# Patient Record
Sex: FEMALE | Race: WHITE | Employment: OTHER | URBAN - METROPOLITAN AREA
[De-identification: names, ages, dates, MRNs, and addresses within clinical notes are randomized per-mention and may not be internally consistent; named-entity substitution may affect disease eponyms.]

---

## 2017-12-27 ENCOUNTER — HOSPITAL ENCOUNTER (OUTPATIENT)
Dept: WOMENS IMAGING | Age: 67
Discharge: HOME | End: 2017-12-27
Payer: MEDICARE

## 2017-12-27 DIAGNOSIS — Z12.31 ENCOUNTER FOR SCREENING MAMMOGRAM FOR MALIGNANT NEOPLASM OF BREAST: ICD-10-CM

## 2017-12-27 PROCEDURE — G0202 SCR MAMMO BI INCL CAD: HCPCS

## 2018-01-09 ENCOUNTER — HOSPITAL ENCOUNTER (OUTPATIENT)
Dept: WOMENS IMAGING | Age: 68
Discharge: HOME | End: 2018-01-09
Payer: MEDICARE

## 2018-01-09 DIAGNOSIS — R92.8 ABNORMAL MAMMOGRAM: ICD-10-CM

## 2018-01-09 PROCEDURE — G0279 TOMOSYNTHESIS, MAMMO: HCPCS

## 2018-01-09 PROCEDURE — 76642 ULTRASOUND BREAST LIMITED: CPT

## 2018-02-26 PROBLEM — E11.9 TYPE 2 DIABETES MELLITUS WITHOUT COMPLICATION, WITHOUT LONG-TERM CURRENT USE OF INSULIN (HCC): Status: ACTIVE | Noted: 2018-02-26

## 2018-05-14 PROBLEM — L57.0 ACTINIC KERATOSES: Status: ACTIVE | Noted: 2018-05-14

## 2018-10-26 ENCOUNTER — HOSPITAL ENCOUNTER (OUTPATIENT)
Age: 68
Discharge: HOME | End: 2018-10-26
Payer: MEDICARE

## 2018-10-26 DIAGNOSIS — R19.7 DIARRHEA OF PRESUMED INFECTIOUS ORIGIN: ICD-10-CM

## 2018-10-26 PROCEDURE — 87505 NFCT AGENT DETECTION GI: CPT

## 2018-10-26 PROCEDURE — 87493 C DIFF AMPLIFIED PROBE: CPT

## 2018-10-27 LAB
C DIFFICILE TOXINS, PCR: NORMAL
CAMPYLOBACTER PCR: NORMAL
SALMONELLA PCR: NORMAL
SHIGATOXIN GENE PCR: NORMAL
SHIGELLA SP PCR: NORMAL
SPECIMEN DESCRIPTION: NORMAL
SPECIMEN: NORMAL

## 2018-12-11 ENCOUNTER — HOSPITAL ENCOUNTER (OUTPATIENT)
Age: 68
Discharge: HOME | End: 2018-12-11
Payer: MEDICARE

## 2018-12-11 DIAGNOSIS — E11.9 TYPE 2 DIABETES MELLITUS WITHOUT COMPLICATION, WITHOUT LONG-TERM CURRENT USE OF INSULIN (HCC): ICD-10-CM

## 2018-12-11 DIAGNOSIS — Z11.59 NEED FOR HEPATITIS C SCREENING TEST: ICD-10-CM

## 2018-12-11 LAB
ABSOLUTE EOS #: 0.1 K/UL (ref 0–0.4)
ABSOLUTE IMMATURE GRANULOCYTE: NORMAL K/UL (ref 0–0.3)
ABSOLUTE LYMPH #: 1.6 K/UL (ref 1–4.8)
ABSOLUTE MONO #: 0.3 K/UL (ref 0.1–1.3)
ALBUMIN SERPL-MCNC: 4.4 G/DL (ref 3.5–5.2)
ALBUMIN/GLOBULIN RATIO: ABNORMAL (ref 1–2.5)
ALP BLD-CCNC: 77 U/L (ref 35–104)
ALT SERPL-CCNC: 18 U/L (ref 5–33)
ANION GAP SERPL CALCULATED.3IONS-SCNC: 10 MMOL/L (ref 9–17)
AST SERPL-CCNC: 15 U/L
BASOPHILS # BLD: 2 % (ref 0–2)
BASOPHILS ABSOLUTE: 0.1 K/UL (ref 0–0.2)
BILIRUB SERPL-MCNC: 0.45 MG/DL (ref 0.3–1.2)
BUN BLDV-MCNC: 28 MG/DL (ref 8–23)
BUN/CREAT BLD: ABNORMAL (ref 9–20)
CALCIUM SERPL-MCNC: 9.6 MG/DL (ref 8.6–10.4)
CHLORIDE BLD-SCNC: 101 MMOL/L (ref 98–107)
CHOLESTEROL/HDL RATIO: 3.2
CHOLESTEROL: 170 MG/DL
CO2: 27 MMOL/L (ref 20–31)
CREAT SERPL-MCNC: 0.73 MG/DL (ref 0.5–0.9)
DIFFERENTIAL TYPE: NORMAL
EOSINOPHILS RELATIVE PERCENT: 3 % (ref 0–4)
GFR AFRICAN AMERICAN: >60 ML/MIN
GFR NON-AFRICAN AMERICAN: >60 ML/MIN
GFR SERPL CREATININE-BSD FRML MDRD: ABNORMAL ML/MIN/{1.73_M2}
GFR SERPL CREATININE-BSD FRML MDRD: ABNORMAL ML/MIN/{1.73_M2}
GLUCOSE BLD-MCNC: 173 MG/DL (ref 70–99)
HCT VFR BLD CALC: 38.6 % (ref 36–46)
HDLC SERPL-MCNC: 53 MG/DL
HEMOGLOBIN: 13.3 G/DL (ref 12–16)
HEPATITIS C ANTIBODY: NONREACTIVE
IMMATURE GRANULOCYTES: NORMAL %
LDL CHOLESTEROL: 85 MG/DL (ref 0–130)
LYMPHOCYTES # BLD: 36 % (ref 24–44)
MCH RBC QN AUTO: 31.4 PG (ref 26–34)
MCHC RBC AUTO-ENTMCNC: 34.5 G/DL (ref 31–37)
MCV RBC AUTO: 91.2 FL (ref 80–100)
MONOCYTES # BLD: 7 % (ref 1–7)
NRBC AUTOMATED: NORMAL PER 100 WBC
PDW BLD-RTO: 12.9 % (ref 11.5–14.9)
PLATELET # BLD: 206 K/UL (ref 150–450)
PLATELET ESTIMATE: NORMAL
PMV BLD AUTO: 7.3 FL (ref 6–12)
POTASSIUM SERPL-SCNC: 4.1 MMOL/L (ref 3.7–5.3)
RBC # BLD: 4.24 M/UL (ref 4–5.2)
RBC # BLD: NORMAL 10*6/UL
SEG NEUTROPHILS: 52 % (ref 36–66)
SEGMENTED NEUTROPHILS ABSOLUTE COUNT: 2.4 K/UL (ref 1.3–9.1)
SODIUM BLD-SCNC: 138 MMOL/L (ref 135–144)
TOTAL PROTEIN: 7.6 G/DL (ref 6.4–8.3)
TRIGL SERPL-MCNC: 161 MG/DL
VLDLC SERPL CALC-MCNC: ABNORMAL MG/DL (ref 1–30)
WBC # BLD: 4.6 K/UL (ref 3.5–11)
WBC # BLD: NORMAL 10*3/UL

## 2018-12-11 PROCEDURE — 80061 LIPID PANEL: CPT

## 2018-12-11 PROCEDURE — 85025 COMPLETE CBC W/AUTO DIFF WBC: CPT

## 2018-12-11 PROCEDURE — 86803 HEPATITIS C AB TEST: CPT

## 2018-12-11 PROCEDURE — 80053 COMPREHEN METABOLIC PANEL: CPT

## 2018-12-11 PROCEDURE — 36415 COLL VENOUS BLD VENIPUNCTURE: CPT

## 2018-12-17 ENCOUNTER — OFFICE VISIT (OUTPATIENT)
Dept: PRIMARY CARE CLINIC | Age: 68
End: 2018-12-17
Payer: MEDICARE

## 2018-12-17 VITALS
OXYGEN SATURATION: 97 % | BODY MASS INDEX: 28.13 KG/M2 | DIASTOLIC BLOOD PRESSURE: 80 MMHG | WEIGHT: 153.8 LBS | HEART RATE: 93 BPM | SYSTOLIC BLOOD PRESSURE: 118 MMHG

## 2018-12-17 DIAGNOSIS — E11.9 TYPE 2 DIABETES MELLITUS WITHOUT COMPLICATION, WITHOUT LONG-TERM CURRENT USE OF INSULIN (HCC): Primary | ICD-10-CM

## 2018-12-17 DIAGNOSIS — L30.8 OTHER ECZEMA: ICD-10-CM

## 2018-12-17 DIAGNOSIS — I10 ESSENTIAL HYPERTENSION: ICD-10-CM

## 2018-12-17 LAB — HBA1C MFR BLD: 7 %

## 2018-12-17 PROCEDURE — 4040F PNEUMOC VAC/ADMIN/RCVD: CPT | Performed by: FAMILY MEDICINE

## 2018-12-17 PROCEDURE — 3045F PR MOST RECENT HEMOGLOBIN A1C LEVEL 7.0-9.0%: CPT | Performed by: FAMILY MEDICINE

## 2018-12-17 PROCEDURE — 1090F PRES/ABSN URINE INCON ASSESS: CPT | Performed by: FAMILY MEDICINE

## 2018-12-17 PROCEDURE — 1036F TOBACCO NON-USER: CPT | Performed by: FAMILY MEDICINE

## 2018-12-17 PROCEDURE — G8482 FLU IMMUNIZE ORDER/ADMIN: HCPCS | Performed by: FAMILY MEDICINE

## 2018-12-17 PROCEDURE — 1123F ACP DISCUSS/DSCN MKR DOCD: CPT | Performed by: FAMILY MEDICINE

## 2018-12-17 PROCEDURE — 1101F PT FALLS ASSESS-DOCD LE1/YR: CPT | Performed by: FAMILY MEDICINE

## 2018-12-17 PROCEDURE — G8427 DOCREV CUR MEDS BY ELIG CLIN: HCPCS | Performed by: FAMILY MEDICINE

## 2018-12-17 PROCEDURE — 83037 HB GLYCOSYLATED A1C HOME DEV: CPT | Performed by: FAMILY MEDICINE

## 2018-12-17 PROCEDURE — G8400 PT W/DXA NO RESULTS DOC: HCPCS | Performed by: FAMILY MEDICINE

## 2018-12-17 PROCEDURE — G8419 CALC BMI OUT NRM PARAM NOF/U: HCPCS | Performed by: FAMILY MEDICINE

## 2018-12-17 PROCEDURE — 99214 OFFICE O/P EST MOD 30 MIN: CPT | Performed by: FAMILY MEDICINE

## 2018-12-17 PROCEDURE — 3017F COLORECTAL CA SCREEN DOC REV: CPT | Performed by: FAMILY MEDICINE

## 2018-12-17 PROCEDURE — 2022F DILAT RTA XM EVC RTNOPTHY: CPT | Performed by: FAMILY MEDICINE

## 2018-12-17 RX ORDER — LISINOPRIL AND HYDROCHLOROTHIAZIDE 25; 20 MG/1; MG/1
TABLET ORAL
Qty: 90 TABLET | Refills: 3 | Status: SHIPPED | OUTPATIENT
Start: 2018-12-17 | End: 2019-12-04 | Stop reason: SDUPTHER

## 2018-12-17 RX ORDER — MELOXICAM 15 MG/1
TABLET ORAL
Qty: 90 TABLET | Refills: 3 | Status: SHIPPED | OUTPATIENT
Start: 2018-12-17 | End: 2019-12-04 | Stop reason: SDUPTHER

## 2018-12-17 ASSESSMENT — ENCOUNTER SYMPTOMS
VOMITING: 0
WHEEZING: 0
SORE THROAT: 0
SHORTNESS OF BREATH: 0
DIARRHEA: 1
RHINORRHEA: 0
ABDOMINAL PAIN: 0
EYE REDNESS: 0
EYE DISCHARGE: 0
NAUSEA: 0
COUGH: 0

## 2018-12-17 NOTE — PROGRESS NOTES
Right eye exhibits no discharge. Left eye exhibits no discharge. No scleral icterus. Neck: No tracheal deviation present. No thyromegaly present. Cardiovascular: Normal rate, regular rhythm and normal heart sounds. No carotid bruits   Pulmonary/Chest: Effort normal and breath sounds normal. No respiratory distress. She has no wheezes. Musculoskeletal: She exhibits no edema. Lymphadenopathy:     She has no cervical adenopathy. Neurological: She is alert and oriented to person, place, and time. Skin: Skin is warm. No rash noted. Spot of flakey red area about quarter size- left breast   Psychiatric: She has a normal mood and affect. Her behavior is normal. Thought content normal.   Nursing note and vitals reviewed. Assessment:       Diagnosis Orders   1. Type 2 diabetes mellitus without complication, without long-term current use of insulin (HCC)  POCT glycosylated hemoglobin, home device (HbA1C)    [place] COLLECTION CAPILLARY BLOOD SPECIMEN   2. Essential hypertension  lisinopril-hydrochlorothiazide (PRINZIDE;ZESTORETIC) 20-25 MG per tablet   3. Other eczema          Plan:    hydrocortisone cream on breast rash- if not better in a week call. Return in about 3 months (around 3/17/2019). Orders Placed This Encounter   Procedures    POCT glycosylated hemoglobin, home device (HbA1C)    [place] COLLECTION CAPILLARY BLOOD SPECIMEN     Orders Placed This Encounter   Medications    metFORMIN (GLUCOPHAGE) 500 MG tablet     Sig: Take 0.5 tablets by mouth 2 times daily (with meals)     Dispense:  90 tablet     Refill:  3    meloxicam (MOBIC) 15 MG tablet     Sig: TAKE ONE TABLET BY MOUTH DAILY AS NEEDED FOR PAIN     Dispense:  90 tablet     Refill:  3    lisinopril-hydrochlorothiazide (PRINZIDE;ZESTORETIC) 20-25 MG per tablet     Sig: TAKE ONE TABLET BY MOUTH DAILY     Dispense:  90 tablet     Refill:  3       Patient given educationalmaterials - see patient instructions.   Discussed use, benefit, and

## 2019-01-03 ENCOUNTER — HOSPITAL ENCOUNTER (OUTPATIENT)
Dept: WOMENS IMAGING | Age: 69
Discharge: HOME | End: 2019-01-05
Payer: MEDICARE

## 2019-01-03 DIAGNOSIS — Z12.31 ENCOUNTER FOR SCREENING MAMMOGRAM FOR MALIGNANT NEOPLASM OF BREAST: ICD-10-CM

## 2019-01-03 PROCEDURE — 77063 BREAST TOMOSYNTHESIS BI: CPT

## 2019-02-14 ENCOUNTER — PATIENT MESSAGE (OUTPATIENT)
Dept: PRIMARY CARE CLINIC | Age: 69
End: 2019-02-14

## 2019-04-01 ENCOUNTER — OFFICE VISIT (OUTPATIENT)
Dept: PRIMARY CARE CLINIC | Age: 69
End: 2019-04-01
Payer: MEDICARE

## 2019-04-01 VITALS
OXYGEN SATURATION: 98 % | DIASTOLIC BLOOD PRESSURE: 82 MMHG | HEIGHT: 62 IN | SYSTOLIC BLOOD PRESSURE: 140 MMHG | HEART RATE: 79 BPM | WEIGHT: 159.5 LBS | BODY MASS INDEX: 29.35 KG/M2

## 2019-04-01 DIAGNOSIS — E11.9 TYPE 2 DIABETES MELLITUS WITHOUT COMPLICATION, WITHOUT LONG-TERM CURRENT USE OF INSULIN (HCC): Primary | ICD-10-CM

## 2019-04-01 DIAGNOSIS — I10 ESSENTIAL HYPERTENSION: ICD-10-CM

## 2019-04-01 LAB — HBA1C MFR BLD: 7.5 %

## 2019-04-01 PROCEDURE — 3017F COLORECTAL CA SCREEN DOC REV: CPT | Performed by: FAMILY MEDICINE

## 2019-04-01 PROCEDURE — 1123F ACP DISCUSS/DSCN MKR DOCD: CPT | Performed by: FAMILY MEDICINE

## 2019-04-01 PROCEDURE — 90732 PPSV23 VACC 2 YRS+ SUBQ/IM: CPT | Performed by: FAMILY MEDICINE

## 2019-04-01 PROCEDURE — G8400 PT W/DXA NO RESULTS DOC: HCPCS | Performed by: FAMILY MEDICINE

## 2019-04-01 PROCEDURE — 99214 OFFICE O/P EST MOD 30 MIN: CPT | Performed by: FAMILY MEDICINE

## 2019-04-01 PROCEDURE — 4040F PNEUMOC VAC/ADMIN/RCVD: CPT | Performed by: FAMILY MEDICINE

## 2019-04-01 PROCEDURE — G0009 ADMIN PNEUMOCOCCAL VACCINE: HCPCS | Performed by: FAMILY MEDICINE

## 2019-04-01 PROCEDURE — 83036 HEMOGLOBIN GLYCOSYLATED A1C: CPT | Performed by: FAMILY MEDICINE

## 2019-04-01 PROCEDURE — 1090F PRES/ABSN URINE INCON ASSESS: CPT | Performed by: FAMILY MEDICINE

## 2019-04-01 PROCEDURE — 3045F PR MOST RECENT HEMOGLOBIN A1C LEVEL 7.0-9.0%: CPT | Performed by: FAMILY MEDICINE

## 2019-04-01 PROCEDURE — 2022F DILAT RTA XM EVC RTNOPTHY: CPT | Performed by: FAMILY MEDICINE

## 2019-04-01 PROCEDURE — G8427 DOCREV CUR MEDS BY ELIG CLIN: HCPCS | Performed by: FAMILY MEDICINE

## 2019-04-01 PROCEDURE — G8419 CALC BMI OUT NRM PARAM NOF/U: HCPCS | Performed by: FAMILY MEDICINE

## 2019-04-01 PROCEDURE — 1036F TOBACCO NON-USER: CPT | Performed by: FAMILY MEDICINE

## 2019-04-01 RX ORDER — BLOOD-GLUCOSE METER
KIT MISCELLANEOUS
Qty: 1 KIT | Refills: 0 | Status: SHIPPED | OUTPATIENT
Start: 2019-04-01 | End: 2019-04-24

## 2019-04-01 ASSESSMENT — ENCOUNTER SYMPTOMS
SHORTNESS OF BREATH: 0
VOMITING: 0
DIARRHEA: 0
NAUSEA: 0
SORE THROAT: 0
ABDOMINAL PAIN: 0
WHEEZING: 0
COUGH: 0
RHINORRHEA: 0
EYE DISCHARGE: 0
EYE REDNESS: 0

## 2019-04-01 ASSESSMENT — PATIENT HEALTH QUESTIONNAIRE - PHQ9
SUM OF ALL RESPONSES TO PHQ QUESTIONS 1-9: 0
SUM OF ALL RESPONSES TO PHQ QUESTIONS 1-9: 0
2. FEELING DOWN, DEPRESSED OR HOPELESS: 0
1. LITTLE INTEREST OR PLEASURE IN DOING THINGS: 0
SUM OF ALL RESPONSES TO PHQ9 QUESTIONS 1 & 2: 0

## 2019-04-01 NOTE — PROGRESS NOTES
[facility] PRIMARY CARE  [id number] [address] [facility]  Dept: [phone]    Sol Arellano is a 77 y.o. female who presents today for her medical conditions/complaintsas noted below. Sol Arellano is c/o of   Chief Complaint   Patient presents with    Diabetes     3 month follow up. Pt does not check her sugars.  Other     Pt has a spot under her right breast she wants looked at. HPI:     HPI  No symptoms of low blood sugars. Not checking sugars. Hemoglobin A1C (%)   Date Value   04/01/2019 7.5   12/17/2018 7.0   09/10/2018 6.0             ( goal A1C is < 7)   No results found for: LABMICR  LDL Cholesterol (mg/dL)   Date Value   12/11/2018 85     LDL Calculated (mg/dL)   Date Value   11/20/2017 78   12/15/2016 91       (goal LDL is <100)   AST (U/L)   Date Value   12/11/2018 15     ALT (U/L)   Date Value   12/11/2018 18     BUN (mg/dL)   Date Value   12/11/2018 28 (H)     BP Readings from Last 3 Encounters:   04/01/19 (!) 140/82   12/17/18 118/80   10/26/18 132/78          (goal 140/90)    Past Medical History:   Diagnosis Date    H/O neoplasm of uncertain behavior of skin     Hypertension     Stye     right eye        Social History     Tobacco Use    Smoking status: Never Smoker    Smokeless tobacco: Never Used   Substance Use Topics    Alcohol use: Yes      Current Outpatient Medications   Medication Sig Dispense Refill    glucose monitoring kit (FREESTYLE) monitoring kit Kit and supplies for checking BS bid due to uncontrolled BS. Does not have to be [place] system- whatever is covered by insurance.  1 kit 0    Probiotic Product (PROBIOTIC DAILY PO) Take by mouth      metFORMIN (GLUCOPHAGE) 500 MG tablet Take 0.5 tablets by mouth 2 times daily (with meals) 90 tablet 3    meloxicam (MOBIC) 15 MG tablet TAKE ONE TABLET BY MOUTH DAILY AS NEEDED FOR PAIN 90 tablet 3    lisinopril-hydrochlorothiazide (PRINZIDE;ZESTORETIC) 20-25 MG Patient given educationalmaterials - see patient instructions. Discussed use, benefit, and side effectsof prescribed medications. All patient questions answered. Pt voiced understanding. Reviewed health maintenance. Instructed to continue current medications, diet andexercise. Patient agreed with treatment plan. Follow up as directed.      Electronicallysigned by Melina Jj MD on 4/1/2019 at 3:30 PM

## 2019-04-02 LAB
CREATINE, URINE: 73 MG/DL (ref 28–217)
MICROALBUMIN/CREAT 24H UR: <1.2 MG/DL (ref 1.2–40)
MICROALBUMIN/CREAT UR-RTO: ABNORMAL MG/G

## 2019-04-12 ENCOUNTER — OFFICE VISIT (OUTPATIENT)
Dept: PRIMARY CARE CLINIC | Age: 69
End: 2019-04-12
Payer: MEDICARE

## 2019-04-12 VITALS
OXYGEN SATURATION: 97 % | DIASTOLIC BLOOD PRESSURE: 76 MMHG | HEART RATE: 80 BPM | BODY MASS INDEX: 28.59 KG/M2 | SYSTOLIC BLOOD PRESSURE: 120 MMHG | WEIGHT: 157.6 LBS

## 2019-04-12 DIAGNOSIS — D48.5 NEOPLASM OF UNCERTAIN BEHAVIOR OF SKIN: ICD-10-CM

## 2019-04-12 DIAGNOSIS — D18.01 CHERRY ANGIOMA: Primary | ICD-10-CM

## 2019-04-12 DIAGNOSIS — L82.1 SEBORRHEIC KERATOSES: ICD-10-CM

## 2019-04-12 DIAGNOSIS — L57.0 ACTINIC KERATOSES: ICD-10-CM

## 2019-04-12 PROCEDURE — 17000 DESTRUCT PREMALG LESION: CPT | Performed by: PHYSICIAN ASSISTANT

## 2019-04-12 PROCEDURE — 99214 OFFICE O/P EST MOD 30 MIN: CPT | Performed by: PHYSICIAN ASSISTANT

## 2019-04-12 NOTE — PROGRESS NOTES
[facility] Primary Care  [address] [facility]  Phone: [phone]  Fax: [phone]    Guerita Mg is a 77 y.o. female who presents today for her Complete Skin Check. Personal history ofskin cancer:  No  Family history of skin cancer? Yes Type? Melanoma in brother  Any Concerning Lesions: Under right breast  Do you wear Sunscreen:  Yes  Do you work or play outsideregularly:  No      Physical Exam  Face:  One rough papule on right temple, o wounds, no dryness or color changes  Ears:  No suspicious lesions, no wounds, no dryness or color changes  Neck:  No suspicious lesions, no wounds, no dryness or color changes  Scalp:  Hair normal, no patchy-spots noted  Chest:  Chest appears Normal with several cherry angiomas of various sizes, scattered SKs and few compound nevi, well circumscribed. Back:  Back appears normal with scattered SKs, largest on left back in braline. Scattered cherry angiomas. Legs:  Right posterior leg: irritated flesh colored papule   Arms:  No suspicious lesions, no wounds, no dryness or color changes  Hands:  No Callus, Ulcers, Abnormal nails or suspicious lesions  Breasts:  No suspicious lesions, no wounds or dryness or color changes. 2 SKs on left breast and 1 under right breast is irritated        Plan:  Discussed good skin care. Discussed proper sun Protection. Patienteducation given on skin cancer and precancers. Moisturize the AK treated today to minimize irritation     Return for Friday for 2 shave biopsies .     [facility]

## 2019-04-12 NOTE — PROGRESS NOTES
[facility] Keratoses destruction procedure note:  4/12/2019  Marielos Bone  1950    /76   Pulse 80   Wt 157 lb 9.6 oz (71.5 kg)   SpO2 97%   BMI 28.59 kg/m²   ALL VITALS REVIEWED    No Known Allergies    Chief Complaint   Patient presents with    Procedure     Full body skin check     Written consent was obtained. Lesion(s) cleansed with Alcohol. Location:  Right preauricular    Histofreeze applied with applicator. Numberlesions treated: 1    Physical Exam   HENT:   Head:           Patient tolerated procedure well. Diagnosis Orders   1. Cherry angioma     2. Seborrheic keratoses     3. Actinic keratoses  WY DESTRUC PREMALIGNANT, FIRST LESION   4. Neoplasm of uncertain behavior of skin         Follow Up: Wound care discussed. Keep well moisturized. Watch for signs of infection which would include:  Redness, swelling, fever. If signs appear, please return to office. Return for Friday for 2 shave biopsies .     Electronically signed by Sheri Mike PA-C on 4/12/2019 at 8:33 AM

## 2019-04-16 NOTE — ADDENDUM NOTE
Addended by: Marcha Bamberger NATALIIA on: 4/16/2019 12:54 PM     Modules accepted: Level of Service

## 2019-04-19 ENCOUNTER — HOSPITAL ENCOUNTER (OUTPATIENT)
Age: 69
Setting detail: SPECIMEN
Discharge: HOME | End: 2019-04-19
Payer: MEDICARE

## 2019-04-19 ENCOUNTER — PROCEDURE VISIT (OUTPATIENT)
Dept: PRIMARY CARE CLINIC | Age: 69
End: 2019-04-19
Payer: MEDICARE

## 2019-04-19 VITALS
HEART RATE: 60 BPM | OXYGEN SATURATION: 96 % | BODY MASS INDEX: 28.34 KG/M2 | DIASTOLIC BLOOD PRESSURE: 70 MMHG | WEIGHT: 156.2 LBS | SYSTOLIC BLOOD PRESSURE: 122 MMHG

## 2019-04-19 DIAGNOSIS — L82.0 INFLAMED SEBORRHEIC KERATOSIS: ICD-10-CM

## 2019-04-19 DIAGNOSIS — D48.5 NEOPLASM OF UNCERTAIN BEHAVIOR OF SKIN: Primary | ICD-10-CM

## 2019-04-19 PROCEDURE — 11102 TANGNTL BX SKIN SINGLE LES: CPT | Performed by: PHYSICIAN ASSISTANT

## 2019-04-19 PROCEDURE — 11103 TANGNTL BX SKIN EA SEP/ADDL: CPT | Performed by: PHYSICIAN ASSISTANT

## 2019-04-19 NOTE — PROGRESS NOTES
Skin Biopsy Procedure Note  4/19/2019  [facility]  1950    /70   Pulse 60   Wt 156 lb 3.2 oz (70.9 kg)   SpO2 96%   BMI 28.34 kg/m²   VITALS REVIEWED    No Known Allergies    Chief Complaint   Patient presents with    Procedure       Lesion:  · 1. Under right breast in bra line  · 2. Posterior right leg      Indication for Biopsy 1: Irritated SK  Indication for Biopsy 2: non healing hyperkeratotic lesion. Family hx of melanoma      Procedure: The lesion(s) were cleansed with Alcohol.  2% lidocaine without epinephrine was used for local anaesthesia. 1. A 1cm  shave was used to obtain a specimen. 2.    A 4mm shave was used. The specimen(s) were    preserved and sent for pathological examination. The biopsy site(s) were  cleansed and hemostasis using ACl and a pressure dressing was achieved with good results. The patient was instructed on wound care. No complications occurred and the patient tolerated the procedure well. Diagnosis Orders   1. Neoplasm of uncertain behavior of skin     2. Inflamed seborrheic keratosis         Follow Up: Wound care discussed. Keep well moisturized. Watch for signs of infection which would include:  Redness, swelling, fever. If signs appear, please return to office. Return for Will call with results.        Electronically signed by Leonard Cabrera PA-C on 4/19/2019 at 10:22 AM

## 2019-04-23 ENCOUNTER — PATIENT MESSAGE (OUTPATIENT)
Dept: PRIMARY CARE CLINIC | Age: 69
End: 2019-04-23

## 2019-04-23 DIAGNOSIS — E11.9 TYPE 2 DIABETES MELLITUS WITHOUT COMPLICATION, WITHOUT LONG-TERM CURRENT USE OF INSULIN (HCC): Primary | ICD-10-CM

## 2019-04-23 DIAGNOSIS — R73.9 HYPERGLYCEMIA: ICD-10-CM

## 2019-04-23 LAB — DERMATOLOGY PATHOLOGY REPORT: NORMAL

## 2019-04-24 RX ORDER — GLUCOSAMINE HCL/CHONDROITIN SU 500-400 MG
CAPSULE ORAL
Qty: 100 STRIP | Refills: 3 | Status: SHIPPED | OUTPATIENT
Start: 2019-04-24 | End: 2019-04-24

## 2019-04-24 NOTE — TELEPHONE ENCOUNTER
Patient called back and advised that [facility] should have been removed from chart a couple of years ago. Now removed and clarified type of strips, how often testing. Cancelled [facility] script and sent new script to [facility].

## 2019-04-24 NOTE — TELEPHONE ENCOUNTER
From: Kimmie Muller  To: Jasmyne Potts MD  Sent: 4/23/2019 11:37 PM EDT  Subject: Prescription Question    Please send a refill for test strips. Refill list shows the glucose meter and supplies. Obviously I only need the test strips. Thanks.     Nabila El

## 2019-06-03 ENCOUNTER — TELEPHONE (OUTPATIENT)
Dept: PRIMARY CARE CLINIC | Age: 69
End: 2019-06-03

## 2019-06-03 NOTE — TELEPHONE ENCOUNTER
Left a message for Ananda Oates regarding her appointment scheduled for 07/01/2019. Please reschedule when patient returns the call. Thank you.

## 2019-07-08 ENCOUNTER — OFFICE VISIT (OUTPATIENT)
Dept: PRIMARY CARE CLINIC | Age: 69
End: 2019-07-08
Payer: MEDICARE

## 2019-07-08 VITALS
OXYGEN SATURATION: 97 % | WEIGHT: 154.8 LBS | SYSTOLIC BLOOD PRESSURE: 116 MMHG | BODY MASS INDEX: 28.09 KG/M2 | DIASTOLIC BLOOD PRESSURE: 68 MMHG | HEART RATE: 78 BPM

## 2019-07-08 DIAGNOSIS — I10 ESSENTIAL HYPERTENSION: ICD-10-CM

## 2019-07-08 DIAGNOSIS — E11.9 TYPE 2 DIABETES MELLITUS WITHOUT COMPLICATION, WITHOUT LONG-TERM CURRENT USE OF INSULIN (HCC): Primary | ICD-10-CM

## 2019-07-08 LAB — HBA1C MFR BLD: 7.3 %

## 2019-07-08 PROCEDURE — 1090F PRES/ABSN URINE INCON ASSESS: CPT | Performed by: FAMILY MEDICINE

## 2019-07-08 PROCEDURE — 4040F PNEUMOC VAC/ADMIN/RCVD: CPT | Performed by: FAMILY MEDICINE

## 2019-07-08 PROCEDURE — 1123F ACP DISCUSS/DSCN MKR DOCD: CPT | Performed by: FAMILY MEDICINE

## 2019-07-08 PROCEDURE — 3017F COLORECTAL CA SCREEN DOC REV: CPT | Performed by: FAMILY MEDICINE

## 2019-07-08 PROCEDURE — 99213 OFFICE O/P EST LOW 20 MIN: CPT | Performed by: FAMILY MEDICINE

## 2019-07-08 PROCEDURE — G8427 DOCREV CUR MEDS BY ELIG CLIN: HCPCS | Performed by: FAMILY MEDICINE

## 2019-07-08 PROCEDURE — 3045F PR MOST RECENT HEMOGLOBIN A1C LEVEL 7.0-9.0%: CPT | Performed by: FAMILY MEDICINE

## 2019-07-08 PROCEDURE — 2022F DILAT RTA XM EVC RTNOPTHY: CPT | Performed by: FAMILY MEDICINE

## 2019-07-08 PROCEDURE — 1036F TOBACCO NON-USER: CPT | Performed by: FAMILY MEDICINE

## 2019-07-08 PROCEDURE — G8400 PT W/DXA NO RESULTS DOC: HCPCS | Performed by: FAMILY MEDICINE

## 2019-07-08 PROCEDURE — 83036 HEMOGLOBIN GLYCOSYLATED A1C: CPT | Performed by: FAMILY MEDICINE

## 2019-07-08 PROCEDURE — G8419 CALC BMI OUT NRM PARAM NOF/U: HCPCS | Performed by: FAMILY MEDICINE

## 2019-07-08 ASSESSMENT — ENCOUNTER SYMPTOMS
EYE DISCHARGE: 0
CONSTIPATION: 0
SHORTNESS OF BREATH: 0
VOMITING: 0
EYE PAIN: 0
DIARRHEA: 0
CHEST TIGHTNESS: 0
NAUSEA: 0
SORE THROAT: 0
COUGH: 0

## 2019-07-08 NOTE — PROGRESS NOTES
distress. HENT:   Head: Normocephalic and atraumatic. Mouth/Throat: Oropharynx is clear and moist.   Eyes: Pupils are equal, round, and reactive to light. Conjunctivae are normal. Right eye exhibits no discharge. Left eye exhibits no discharge. No scleral icterus. Neck: No tracheal deviation present. No thyromegaly present. Cardiovascular: Normal rate, regular rhythm and normal heart sounds. No carotid bruits   Pulmonary/Chest: Effort normal and breath sounds normal. No respiratory distress. She has no wheezes. Musculoskeletal: She exhibits no edema. Lymphadenopathy:     She has no cervical adenopathy. Neurological: She is alert and oriented to person, place, and time. Skin: Skin is warm. No rash noted. Psychiatric: She has a normal mood and affect. Her behavior is normal. Thought content normal.   Nursing note and vitals reviewed. Assessment:       Diagnosis Orders   1. Type 2 diabetes mellitus without complication, without long-term current use of insulin (HCC)  [place] COLLECTION CAPILLARY BLOOD SPECIMEN    POCT glycosylated hemoglobin (Hb A1C)   2. Essential hypertension          Plan:      Return in about 3 months (around 10/8/2019) for DM check, HTN f/u. Orders Placed This Encounter   Procedures    POCT glycosylated hemoglobin (Hb A1C)    [place] COLLECTION CAPILLARY BLOOD SPECIMEN     No orders of the defined types were placed in this encounter. Patient given educationalmaterials - see patient instructions. Discussed use, benefit, and side effectsof prescribed medications. All patient questions answered. Pt voiced understanding. Reviewed health maintenance. Instructed to continue current medications, diet andexercise. Patient agreed with treatment plan. Follow up as directed.      Electronicallysigned by Dorian Powell MD on 7/8/2019 at 2:17 PM

## 2019-09-23 ENCOUNTER — OFFICE VISIT (OUTPATIENT)
Dept: PRIMARY CARE CLINIC | Age: 69
End: 2019-09-23
Payer: MEDICARE

## 2019-09-23 VITALS
HEIGHT: 62 IN | HEART RATE: 70 BPM | OXYGEN SATURATION: 96 % | DIASTOLIC BLOOD PRESSURE: 82 MMHG | SYSTOLIC BLOOD PRESSURE: 130 MMHG | WEIGHT: 155 LBS | BODY MASS INDEX: 28.52 KG/M2

## 2019-09-23 DIAGNOSIS — Z00.00 MEDICARE ANNUAL WELLNESS VISIT, SUBSEQUENT: Primary | ICD-10-CM

## 2019-09-23 DIAGNOSIS — Z78.0 MENOPAUSE: ICD-10-CM

## 2019-09-23 DIAGNOSIS — Z00.00 ROUTINE GENERAL MEDICAL EXAMINATION AT A HEALTH CARE FACILITY: ICD-10-CM

## 2019-09-23 PROCEDURE — G0439 PPPS, SUBSEQ VISIT: HCPCS | Performed by: FAMILY MEDICINE

## 2019-09-23 PROCEDURE — 3017F COLORECTAL CA SCREEN DOC REV: CPT | Performed by: FAMILY MEDICINE

## 2019-09-23 PROCEDURE — 4040F PNEUMOC VAC/ADMIN/RCVD: CPT | Performed by: FAMILY MEDICINE

## 2019-09-23 PROCEDURE — 1123F ACP DISCUSS/DSCN MKR DOCD: CPT | Performed by: FAMILY MEDICINE

## 2019-09-23 SDOH — HEALTH STABILITY: MENTAL HEALTH: HOW OFTEN DO YOU HAVE A DRINK CONTAINING ALCOHOL?: 2-4 TIMES A MONTH

## 2019-09-23 ASSESSMENT — PATIENT HEALTH QUESTIONNAIRE - PHQ9
SUM OF ALL RESPONSES TO PHQ QUESTIONS 1-9: 0
1. LITTLE INTEREST OR PLEASURE IN DOING THINGS: 0
2. FEELING DOWN, DEPRESSED OR HOPELESS: 0
SUM OF ALL RESPONSES TO PHQ9 QUESTIONS 1 & 2: 0
SUM OF ALL RESPONSES TO PHQ QUESTIONS 1-9: 0

## 2019-09-23 NOTE — PROGRESS NOTES
Mother     Heart Disease Father     High Blood Pressure Father     Parkinsonism Father     Cancer Brother         melanoma- metastaic    Hypertension Brother     Other Brother         neuro degenerative disease    Mult Sclerosis Nephew        CareTeam (Including outside providers/suppliers regularly involved in providing care):   Patient Care Team:  Joaquin West MD as PCP - General  Joaquin West MD as PCP [facility]    Wt Readings from Last 3 Encounters:   09/23/19 155 lb (70.3 kg)   07/08/19 154 lb 12.8 oz (70.2 kg)   04/19/19 156 lb 3.2 oz (70.9 kg)     Vitals:    09/23/19 1321   BP: 130/82   Pulse: 70   SpO2: 96%   Weight: 155 lb (70.3 kg)   Height: 5' 2\" (1.575 m)     Body mass index is 28.35 kg/m². Based upon direct observation of the patient, evaluation of cognition reveals recent and remote memory intact. Physical Exam   Constitutional: She is oriented to person, place, and time. She appears well-developed and well-nourished. No distress. HENT:   Head: Normocephalic and atraumatic. Eyes: Pupils are equal, round, and reactive to light. Conjunctivae are normal. Right eye exhibits no discharge. Left eye exhibits no discharge. No scleral icterus. Neck: No tracheal deviation present. No thyromegaly present. Cardiovascular: Normal rate, regular rhythm and normal heart sounds. No carotid bruits   Pulmonary/Chest: Effort normal and breath sounds normal. No respiratory distress. She has no wheezes. Musculoskeletal: She exhibits no edema. Lymphadenopathy:     She has no cervical adenopathy. Neurological: She is alert and oriented to person, place, and time. Skin: Skin is warm. No rash noted. Psychiatric: She has a normal mood and affect. Her behavior is normal. Thought content normal.   Nursing note and vitals reviewed. Patient's complete Health Risk Assessment and screening values have been reviewed and are found in Flowsheets.  The following

## 2019-10-22 ENCOUNTER — PROCEDURE VISIT (OUTPATIENT)
Dept: PRIMARY CARE CLINIC | Age: 69
End: 2019-10-22
Payer: MEDICARE

## 2019-10-22 DIAGNOSIS — Z78.0 ASYMPTOMATIC MENOPAUSAL STATE: Primary | ICD-10-CM

## 2019-10-22 DIAGNOSIS — Z78.0 MENOPAUSE: ICD-10-CM

## 2019-10-22 PROCEDURE — 77080 DXA BONE DENSITY AXIAL: CPT | Performed by: FAMILY MEDICINE

## 2019-11-21 ENCOUNTER — OFFICE VISIT (OUTPATIENT)
Dept: PRIMARY CARE CLINIC | Age: 69
End: 2019-11-21
Payer: MEDICARE

## 2019-11-21 VITALS
HEART RATE: 126 BPM | DIASTOLIC BLOOD PRESSURE: 90 MMHG | OXYGEN SATURATION: 96 % | BODY MASS INDEX: 28.2 KG/M2 | SYSTOLIC BLOOD PRESSURE: 146 MMHG | WEIGHT: 154.2 LBS

## 2019-11-21 DIAGNOSIS — M85.89 OSTEOPENIA OF MULTIPLE SITES: ICD-10-CM

## 2019-11-21 DIAGNOSIS — E11.9 TYPE 2 DIABETES MELLITUS WITHOUT COMPLICATION, WITHOUT LONG-TERM CURRENT USE OF INSULIN (HCC): Primary | ICD-10-CM

## 2019-11-21 DIAGNOSIS — I10 ESSENTIAL HYPERTENSION: ICD-10-CM

## 2019-11-21 LAB — HBA1C MFR BLD: 7 %

## 2019-11-21 PROCEDURE — G8427 DOCREV CUR MEDS BY ELIG CLIN: HCPCS | Performed by: FAMILY MEDICINE

## 2019-11-21 PROCEDURE — G8399 PT W/DXA RESULTS DOCUMENT: HCPCS | Performed by: FAMILY MEDICINE

## 2019-11-21 PROCEDURE — 1036F TOBACCO NON-USER: CPT | Performed by: FAMILY MEDICINE

## 2019-11-21 PROCEDURE — 1090F PRES/ABSN URINE INCON ASSESS: CPT | Performed by: FAMILY MEDICINE

## 2019-11-21 PROCEDURE — 83036 HEMOGLOBIN GLYCOSYLATED A1C: CPT | Performed by: FAMILY MEDICINE

## 2019-11-21 PROCEDURE — G8482 FLU IMMUNIZE ORDER/ADMIN: HCPCS | Performed by: FAMILY MEDICINE

## 2019-11-21 PROCEDURE — 4040F PNEUMOC VAC/ADMIN/RCVD: CPT | Performed by: FAMILY MEDICINE

## 2019-11-21 PROCEDURE — 2022F DILAT RTA XM EVC RTNOPTHY: CPT | Performed by: FAMILY MEDICINE

## 2019-11-21 PROCEDURE — 3017F COLORECTAL CA SCREEN DOC REV: CPT | Performed by: FAMILY MEDICINE

## 2019-11-21 PROCEDURE — 99214 OFFICE O/P EST MOD 30 MIN: CPT | Performed by: FAMILY MEDICINE

## 2019-11-21 PROCEDURE — 1123F ACP DISCUSS/DSCN MKR DOCD: CPT | Performed by: FAMILY MEDICINE

## 2019-11-21 PROCEDURE — G8417 CALC BMI ABV UP PARAM F/U: HCPCS | Performed by: FAMILY MEDICINE

## 2019-11-21 ASSESSMENT — ENCOUNTER SYMPTOMS
RHINORRHEA: 0
COUGH: 0
SORE THROAT: 0
NAUSEA: 0
VOMITING: 0
CHEST TIGHTNESS: 0
SHORTNESS OF BREATH: 0

## 2019-12-04 DIAGNOSIS — I10 ESSENTIAL HYPERTENSION: ICD-10-CM

## 2019-12-05 RX ORDER — MELOXICAM 15 MG/1
TABLET ORAL
Qty: 90 TABLET | Refills: 3 | Status: SHIPPED | OUTPATIENT
Start: 2019-12-05 | End: 2020-11-24

## 2019-12-05 RX ORDER — LISINOPRIL AND HYDROCHLOROTHIAZIDE 25; 20 MG/1; MG/1
TABLET ORAL
Qty: 90 TABLET | Refills: 3 | Status: SHIPPED | OUTPATIENT
Start: 2019-12-05 | End: 2021-02-21 | Stop reason: SDUPTHER

## 2019-12-27 ENCOUNTER — HOSPITAL ENCOUNTER (OUTPATIENT)
Age: 69
Discharge: HOME | End: 2019-12-27
Payer: MEDICARE

## 2019-12-27 DIAGNOSIS — E11.9 TYPE 2 DIABETES MELLITUS WITHOUT COMPLICATION, WITHOUT LONG-TERM CURRENT USE OF INSULIN (HCC): ICD-10-CM

## 2019-12-27 DIAGNOSIS — I10 ESSENTIAL HYPERTENSION: ICD-10-CM

## 2019-12-27 LAB
ABSOLUTE EOS #: 0.1 K/UL (ref 0–0.4)
ABSOLUTE IMMATURE GRANULOCYTE: NORMAL K/UL (ref 0–0.3)
ABSOLUTE LYMPH #: 1.9 K/UL (ref 1–4.8)
ABSOLUTE MONO #: 0.4 K/UL (ref 0.1–1.3)
ALBUMIN SERPL-MCNC: 4.2 G/DL (ref 3.5–5.2)
ALBUMIN/GLOBULIN RATIO: ABNORMAL (ref 1–2.5)
ALP BLD-CCNC: 75 U/L (ref 35–104)
ALT SERPL-CCNC: 16 U/L (ref 5–33)
ANION GAP SERPL CALCULATED.3IONS-SCNC: 13 MMOL/L (ref 9–17)
AST SERPL-CCNC: 15 U/L
BASOPHILS # BLD: 1 % (ref 0–2)
BASOPHILS ABSOLUTE: 0.1 K/UL (ref 0–0.2)
BILIRUB SERPL-MCNC: 0.45 MG/DL (ref 0.3–1.2)
BUN BLDV-MCNC: 25 MG/DL (ref 8–23)
BUN/CREAT BLD: ABNORMAL (ref 9–20)
CALCIUM SERPL-MCNC: 9.1 MG/DL (ref 8.6–10.4)
CHLORIDE BLD-SCNC: 104 MMOL/L (ref 98–107)
CO2: 24 MMOL/L (ref 20–31)
CREAT SERPL-MCNC: 0.67 MG/DL (ref 0.5–0.9)
DIFFERENTIAL TYPE: NORMAL
EOSINOPHILS RELATIVE PERCENT: 3 % (ref 0–4)
GFR AFRICAN AMERICAN: >60 ML/MIN
GFR NON-AFRICAN AMERICAN: >60 ML/MIN
GFR SERPL CREATININE-BSD FRML MDRD: ABNORMAL ML/MIN/{1.73_M2}
GFR SERPL CREATININE-BSD FRML MDRD: ABNORMAL ML/MIN/{1.73_M2}
GLUCOSE BLD-MCNC: 145 MG/DL (ref 70–99)
HCT VFR BLD CALC: 38.8 % (ref 36–46)
HEMOGLOBIN: 13.5 G/DL (ref 12–16)
IMMATURE GRANULOCYTES: NORMAL %
LYMPHOCYTES # BLD: 38 % (ref 24–44)
MCH RBC QN AUTO: 31.3 PG (ref 26–34)
MCHC RBC AUTO-ENTMCNC: 34.7 G/DL (ref 31–37)
MCV RBC AUTO: 90.2 FL (ref 80–100)
MONOCYTES # BLD: 7 % (ref 1–7)
NRBC AUTOMATED: NORMAL PER 100 WBC
PDW BLD-RTO: 12.5 % (ref 11.5–14.9)
PLATELET # BLD: 204 K/UL (ref 150–450)
PLATELET ESTIMATE: NORMAL
PMV BLD AUTO: 7.4 FL (ref 6–12)
POTASSIUM SERPL-SCNC: 4 MMOL/L (ref 3.7–5.3)
RBC # BLD: 4.3 M/UL (ref 4–5.2)
RBC # BLD: NORMAL 10*6/UL
SEG NEUTROPHILS: 51 % (ref 36–66)
SEGMENTED NEUTROPHILS ABSOLUTE COUNT: 2.6 K/UL (ref 1.3–9.1)
SODIUM BLD-SCNC: 141 MMOL/L (ref 135–144)
TOTAL PROTEIN: 7.3 G/DL (ref 6.4–8.3)
WBC # BLD: 5 K/UL (ref 3.5–11)
WBC # BLD: NORMAL 10*3/UL

## 2019-12-27 PROCEDURE — 36415 COLL VENOUS BLD VENIPUNCTURE: CPT

## 2019-12-27 PROCEDURE — 85025 COMPLETE CBC W/AUTO DIFF WBC: CPT

## 2019-12-27 PROCEDURE — 80053 COMPREHEN METABOLIC PANEL: CPT

## 2020-03-02 ENCOUNTER — OFFICE VISIT (OUTPATIENT)
Dept: PRIMARY CARE CLINIC | Age: 70
End: 2020-03-02
Payer: MEDICARE

## 2020-03-02 VITALS
OXYGEN SATURATION: 97 % | DIASTOLIC BLOOD PRESSURE: 72 MMHG | HEART RATE: 96 BPM | SYSTOLIC BLOOD PRESSURE: 140 MMHG | WEIGHT: 154.4 LBS | BODY MASS INDEX: 28.24 KG/M2

## 2020-03-02 LAB — HBA1C MFR BLD: 7.2 %

## 2020-03-02 PROCEDURE — 3017F COLORECTAL CA SCREEN DOC REV: CPT | Performed by: FAMILY MEDICINE

## 2020-03-02 PROCEDURE — 1090F PRES/ABSN URINE INCON ASSESS: CPT | Performed by: FAMILY MEDICINE

## 2020-03-02 PROCEDURE — 3051F HG A1C>EQUAL 7.0%<8.0%: CPT | Performed by: FAMILY MEDICINE

## 2020-03-02 PROCEDURE — G8399 PT W/DXA RESULTS DOCUMENT: HCPCS | Performed by: FAMILY MEDICINE

## 2020-03-02 PROCEDURE — 99213 OFFICE O/P EST LOW 20 MIN: CPT | Performed by: FAMILY MEDICINE

## 2020-03-02 PROCEDURE — 4040F PNEUMOC VAC/ADMIN/RCVD: CPT | Performed by: FAMILY MEDICINE

## 2020-03-02 PROCEDURE — 1123F ACP DISCUSS/DSCN MKR DOCD: CPT | Performed by: FAMILY MEDICINE

## 2020-03-02 PROCEDURE — 1036F TOBACCO NON-USER: CPT | Performed by: FAMILY MEDICINE

## 2020-03-02 PROCEDURE — G8427 DOCREV CUR MEDS BY ELIG CLIN: HCPCS | Performed by: FAMILY MEDICINE

## 2020-03-02 PROCEDURE — G8482 FLU IMMUNIZE ORDER/ADMIN: HCPCS | Performed by: FAMILY MEDICINE

## 2020-03-02 PROCEDURE — 2022F DILAT RTA XM EVC RTNOPTHY: CPT | Performed by: FAMILY MEDICINE

## 2020-03-02 PROCEDURE — G8417 CALC BMI ABV UP PARAM F/U: HCPCS | Performed by: FAMILY MEDICINE

## 2020-03-02 PROCEDURE — 83036 HEMOGLOBIN GLYCOSYLATED A1C: CPT | Performed by: FAMILY MEDICINE

## 2020-03-02 ASSESSMENT — ENCOUNTER SYMPTOMS
RHINORRHEA: 0
NAUSEA: 0
EYE REDNESS: 0
SHORTNESS OF BREATH: 0
COUGH: 0
VOMITING: 0
ABDOMINAL PAIN: 0
WHEEZING: 0
EYE DISCHARGE: 0
DIARRHEA: 1
SORE THROAT: 0

## 2020-03-02 ASSESSMENT — PATIENT HEALTH QUESTIONNAIRE - PHQ9
SUM OF ALL RESPONSES TO PHQ9 QUESTIONS 1 & 2: 0
2. FEELING DOWN, DEPRESSED OR HOPELESS: 0
1. LITTLE INTEREST OR PLEASURE IN DOING THINGS: 0
SUM OF ALL RESPONSES TO PHQ QUESTIONS 1-9: 0
SUM OF ALL RESPONSES TO PHQ QUESTIONS 1-9: 0

## 2020-03-02 NOTE — PROGRESS NOTES
[facility] PRIMARY CARE  [address] [facility] [address]  Dept: [phone] Rambo Gio Goyal is a 68 y.o. female who presents today for her medical conditions/complaintsas noted below. Bradley Robertson is c/o of   Chief Complaint   Patient presents with    Diabetes     Patient checks bs at home    Hypertension     Patient doesn't check bp at home       HPI:     HPI  Checking sugars about every morning. Pt increased the metformin to 1.5 tablets but 2 caused too much diarrhea. Still having a bit of cramping occas and some loose stool, but not like it was. Running 100's most of the time. One time was over 200 but not sure why. Walked and rechecked and it was down. Not checking BP at home. No issues with meds otherwise.       Hemoglobin A1C (%)   Date Value   03/02/2020 7.2   11/21/2019 7.0   07/08/2019 7.3             ( goal A1C is < 7)   No results found for: LABMICR  LDL Cholesterol (mg/dL)   Date Value   12/11/2018 85     LDL Calculated (mg/dL)   Date Value   11/20/2017 78   12/15/2016 91       (goal LDL is <100)   AST (U/L)   Date Value   12/27/2019 15     ALT (U/L)   Date Value   12/27/2019 16     BUN (mg/dL)   Date Value   12/27/2019 25 (H)     BP Readings from Last 3 Encounters:   03/02/20 (!) 140/72   11/21/19 (!) 146/90   09/23/19 130/82          (goal 140/90)    Past Medical History:   Diagnosis Date    H/O neoplasm of uncertain behavior of skin     Hypertension     Stye     right eye        Social History     Tobacco Use    Smoking status: Never Smoker    Smokeless tobacco: Never Used   Substance Use Topics    Alcohol use: Yes     Frequency: 2-4 times a month      Current Outpatient Medications   Medication Sig Dispense Refill    blood glucose test strips (TRUE METRIX BLOOD GLUCOSE TEST) strip 1 each by Miscell. (Med.Supl.;Non-Drugs) route daily 100 each 3    metFORMIN (GLUCOPHAGE) 500 MG tablet Take 1 tablet by mouth 2 times daily (with and frequency. Musculoskeletal: Negative for arthralgias and myalgias. Neurological: Negative for dizziness, light-headedness and headaches. Psychiatric/Behavioral: Negative for sleep disturbance. Objective:     BP (!) 140/72   Pulse 96   Wt 154 lb 6.4 oz (70 kg)   SpO2 97%   BMI 28.24 kg/m²   Physical Exam  Vitals signs and nursing note reviewed. Constitutional:       General: She is not in acute distress. Appearance: She is well-developed. HENT:      Head: Normocephalic and atraumatic. Eyes:      General: No scleral icterus. Right eye: No discharge. Left eye: No discharge. Conjunctiva/sclera: Conjunctivae normal.      Pupils: Pupils are equal, round, and reactive to light. Neck:      Thyroid: No thyromegaly. Trachea: No tracheal deviation. Cardiovascular:      Rate and Rhythm: Normal rate and regular rhythm. Heart sounds: Normal heart sounds. Comments: No carotid bruits  Pulmonary:      Effort: Pulmonary effort is normal. No respiratory distress. Breath sounds: Normal breath sounds. No wheezing. Lymphadenopathy:      Cervical: No cervical adenopathy. Skin:     General: Skin is warm. Findings: No rash. Neurological:      Mental Status: She is alert and oriented to person, place, and time. Psychiatric:         Behavior: Behavior normal.         Thought Content: Thought content normal.         Assessment:       Diagnosis Orders   1. Sciatica of right side  External Referral To Massage Therapy   2. Type 2 diabetes mellitus without complication, without long-term current use of insulin ([facility])  blood glucose test strips (TRUE METRIX BLOOD GLUCOSE TEST) strip    POCT glycosylated hemoglobin (Hb A1C)   3. Encounter for screening mammogram for breast cancer  GERRY DIGITAL SCREEN W CAD BILATERAL   4.  Hyperglycemia  blood glucose test strips (TRUE METRIX BLOOD GLUCOSE TEST) strip        Plan:      Return in about 3 months (around 6/2/2020) for DM check.    Orders Placed This Encounter   Procedures    GERRY DIGITAL SCREEN W CAD BILATERAL     Standing Status:   Future     Standing Expiration Date:   2021    External Referral To Massage Therapy     Referral Priority:   Routine     Referral Type:   Consult for Advice and Opinion     Referral Reason:   Specialty Services Required     Requested Specialty:   Massage Therapy     Number of Visits Requested:   1    POCT glycosylated hemoglobin (Hb A1C)     Orders Placed This Encounter   Medications    blood glucose test strips (TRUE METRIX BLOOD GLUCOSE TEST) strip     Si each by Miscell. (Med.Supl.;Non-Drugs) route daily     Dispense:  100 each     Refill:  3     Dx: E11.9, R73.9       Patient given educationalmaterials - see patient instructions. Discussed use, benefit, and side effectsof prescribed medications. All patient questions answered. Pt voiced understanding. Reviewed health maintenance. Instructed to continue current medications, diet andexercise. Patient agreed with treatment plan. Follow up as directed.      Electronicallysigned by Yari Vazquez MD on 3/2/2020 at 1:45 PM

## 2020-03-09 ENCOUNTER — HOSPITAL ENCOUNTER (OUTPATIENT)
Dept: WOMENS IMAGING | Age: 70
Discharge: HOME | End: 2020-03-11
Payer: MEDICARE

## 2020-03-09 PROCEDURE — 77063 BREAST TOMOSYNTHESIS BI: CPT

## 2020-06-08 ENCOUNTER — OFFICE VISIT (OUTPATIENT)
Dept: PRIMARY CARE CLINIC | Age: 70
End: 2020-06-08
Payer: MEDICARE

## 2020-06-08 VITALS
WEIGHT: 153.2 LBS | HEART RATE: 89 BPM | DIASTOLIC BLOOD PRESSURE: 70 MMHG | OXYGEN SATURATION: 98 % | BODY MASS INDEX: 28.02 KG/M2 | TEMPERATURE: 99.3 F | SYSTOLIC BLOOD PRESSURE: 122 MMHG

## 2020-06-08 LAB — HBA1C MFR BLD: 7 %

## 2020-06-08 PROCEDURE — 2022F DILAT RTA XM EVC RTNOPTHY: CPT | Performed by: FAMILY MEDICINE

## 2020-06-08 PROCEDURE — 3017F COLORECTAL CA SCREEN DOC REV: CPT | Performed by: FAMILY MEDICINE

## 2020-06-08 PROCEDURE — G8399 PT W/DXA RESULTS DOCUMENT: HCPCS | Performed by: FAMILY MEDICINE

## 2020-06-08 PROCEDURE — 3051F HG A1C>EQUAL 7.0%<8.0%: CPT | Performed by: FAMILY MEDICINE

## 2020-06-08 PROCEDURE — 83036 HEMOGLOBIN GLYCOSYLATED A1C: CPT | Performed by: FAMILY MEDICINE

## 2020-06-08 PROCEDURE — G8417 CALC BMI ABV UP PARAM F/U: HCPCS | Performed by: FAMILY MEDICINE

## 2020-06-08 PROCEDURE — G8427 DOCREV CUR MEDS BY ELIG CLIN: HCPCS | Performed by: FAMILY MEDICINE

## 2020-06-08 PROCEDURE — 1123F ACP DISCUSS/DSCN MKR DOCD: CPT | Performed by: FAMILY MEDICINE

## 2020-06-08 PROCEDURE — 99213 OFFICE O/P EST LOW 20 MIN: CPT | Performed by: FAMILY MEDICINE

## 2020-06-08 PROCEDURE — 4040F PNEUMOC VAC/ADMIN/RCVD: CPT | Performed by: FAMILY MEDICINE

## 2020-06-08 PROCEDURE — 1090F PRES/ABSN URINE INCON ASSESS: CPT | Performed by: FAMILY MEDICINE

## 2020-06-08 PROCEDURE — 1036F TOBACCO NON-USER: CPT | Performed by: FAMILY MEDICINE

## 2020-06-08 RX ORDER — METFORMIN HYDROCHLORIDE 500 MG/1
500 TABLET, EXTENDED RELEASE ORAL
Qty: 90 TABLET | Refills: 1 | Status: SHIPPED | OUTPATIENT
Start: 2020-06-08 | End: 2020-11-20 | Stop reason: SDUPTHER

## 2020-06-08 ASSESSMENT — ENCOUNTER SYMPTOMS
NAUSEA: 0
EYE DISCHARGE: 0
VOMITING: 0
WHEEZING: 0
EYE REDNESS: 0
ABDOMINAL PAIN: 0
DIARRHEA: 1
RHINORRHEA: 0
COUGH: 0
SORE THROAT: 0
SHORTNESS OF BREATH: 0

## 2020-06-08 NOTE — PROGRESS NOTES
POCT glycosylated hemoglobin (Hb A1C)     Orders Placed This Encounter   Medications    metFORMIN (GLUCOPHAGE-XR) 500 MG extended release tablet     Sig: Take 1 tablet by mouth daily (with breakfast)     Dispense:  90 tablet     Refill:  1       Patient given educationalmaterials - see patient instructions. Discussed use, benefit, and side effectsof prescribed medications. All patient questions answered. Pt voiced understanding. Reviewed health maintenance. Instructed to continue current medications, diet andexercise. Patient agreed with treatment plan. Follow up as directed.      Electronicallysigned by Norma Burciaga MD on 6/8/2020 at 1:24 PM

## 2020-07-13 ENCOUNTER — OFFICE VISIT (OUTPATIENT)
Dept: PRIMARY CARE CLINIC | Age: 70
End: 2020-07-13
Payer: MEDICARE

## 2020-07-13 VITALS
DIASTOLIC BLOOD PRESSURE: 76 MMHG | WEIGHT: 154.8 LBS | HEART RATE: 82 BPM | OXYGEN SATURATION: 97 % | TEMPERATURE: 97.5 F | SYSTOLIC BLOOD PRESSURE: 120 MMHG | BODY MASS INDEX: 28.49 KG/M2 | HEIGHT: 62 IN

## 2020-07-13 PROBLEM — Z80.8 FAMILY HISTORY OF MELANOMA: Status: ACTIVE | Noted: 2020-07-13

## 2020-07-13 PROBLEM — Z86.010 HISTORY OF COLONIC POLYPS: Status: ACTIVE | Noted: 2020-07-13

## 2020-07-13 PROBLEM — Z86.0100 HISTORY OF COLONIC POLYPS: Status: ACTIVE | Noted: 2020-07-13

## 2020-07-13 PROCEDURE — G8399 PT W/DXA RESULTS DOCUMENT: HCPCS | Performed by: PHYSICIAN ASSISTANT

## 2020-07-13 PROCEDURE — 4040F PNEUMOC VAC/ADMIN/RCVD: CPT | Performed by: PHYSICIAN ASSISTANT

## 2020-07-13 PROCEDURE — 99214 OFFICE O/P EST MOD 30 MIN: CPT | Performed by: PHYSICIAN ASSISTANT

## 2020-07-13 PROCEDURE — 1123F ACP DISCUSS/DSCN MKR DOCD: CPT | Performed by: PHYSICIAN ASSISTANT

## 2020-07-13 PROCEDURE — 1036F TOBACCO NON-USER: CPT | Performed by: PHYSICIAN ASSISTANT

## 2020-07-13 PROCEDURE — G8427 DOCREV CUR MEDS BY ELIG CLIN: HCPCS | Performed by: PHYSICIAN ASSISTANT

## 2020-07-13 PROCEDURE — 1090F PRES/ABSN URINE INCON ASSESS: CPT | Performed by: PHYSICIAN ASSISTANT

## 2020-07-13 PROCEDURE — G8417 CALC BMI ABV UP PARAM F/U: HCPCS | Performed by: PHYSICIAN ASSISTANT

## 2020-07-13 PROCEDURE — 3017F COLORECTAL CA SCREEN DOC REV: CPT | Performed by: PHYSICIAN ASSISTANT

## 2020-07-13 NOTE — PROGRESS NOTES
[facility] Primary Care  [address]  Phone: [phone]  Fax: [phone]    Timothy Peralta is a 68 y.o. female who presents today for her Complete Skin Check. Personal history ofskin cancer:  Yes  What type? Precancers   Family history of skin cancer? Yes Type? Melanoma in brother  Any Concerning Lesions? Back left shoulder  Do you wear Sunscreen:  Yes  Do you work or play outsideregularly:  No    /76   Pulse 82   Temp 97.5 °F (36.4 °C)   Ht 5' 2\" (1.575 m)   Wt 154 lb 12.8 oz (70.2 kg)   SpO2 97%   BMI 28.31 kg/m²      Physical Exam  Face: Few lentiginges, no wounds, no dryness or color changes  Ears:  No suspicious lesions, no wounds, no dryness or color changes  Neck:  No suspicious lesions, no wounds, no dryness or color changes  Scalp:  Hair normal, no patchy-spots noted  Chest:  Chest appears Normal with several cherry angiomas of various sizes, scattered SKs and few compound nevi, well circumscribed. Had a hyperkertotic light brown accumulation in superior umbilicus. Did remove large debrie with forceps--patient tolerated well. Back:  Back appears normal with scattered SKs, largest on left back in braline. Scattered cherry angiomas. Lentigines across shoulders and one large dark SK on posterior left. Legs:  Few scattered SKs and Sameera and varicosities. Arms:  Left posterior shoulder has an appx 9mm dark hyperkeratotic lesion. No suspicious lesions, no wounds, no dryness or color changes  Hands:  No Callus, Ulcers, Abnormal nails or suspicious lesions  Breasts:  No suspicious lesions, no wounds or dryness or color changes. 2 SKs on left breast     Assessment:   1. Open comedone  Removed contents from the superior portion of her umbilicus  Make sure to dry umbilicus well daily     2. Cherry angioma  3. Seborrheic keratoses  4.  Lentigines  REassurance given  She will RTO to have the darkest on her posterior left shoulder removed    5. Family history of melanoma          Plan:  Discussed good skin care. Discussed proper sun Protection. Patienteducation given on skin cancer and precancers.     Return for Friday for one shave removal.    [facility]

## 2020-07-31 ENCOUNTER — PROCEDURE VISIT (OUTPATIENT)
Dept: PRIMARY CARE CLINIC | Age: 70
End: 2020-07-31
Payer: MEDICARE

## 2020-07-31 ENCOUNTER — HOSPITAL ENCOUNTER (OUTPATIENT)
Age: 70
Setting detail: SPECIMEN
Discharge: HOME | End: 2020-07-31
Payer: MEDICARE

## 2020-07-31 VITALS
SYSTOLIC BLOOD PRESSURE: 122 MMHG | DIASTOLIC BLOOD PRESSURE: 78 MMHG | WEIGHT: 155 LBS | HEART RATE: 67 BPM | OXYGEN SATURATION: 95 % | TEMPERATURE: 96.8 F | BODY MASS INDEX: 28.52 KG/M2 | HEIGHT: 62 IN

## 2020-07-31 PROCEDURE — 11102 TANGNTL BX SKIN SINGLE LES: CPT | Performed by: PHYSICIAN ASSISTANT

## 2020-07-31 NOTE — PROGRESS NOTES
Skin Biopsy Procedure Note  7/31/2020  Clydie Smoker  1950    /78   Pulse 67   Temp 96.8 °F (36 °C)   Ht 5' 2\" (1.575 m)   Wt 155 lb (70.3 kg)   SpO2 95%   BMI 28.35 kg/m²   VITALS REVIEWED    No Known Allergies    Chief Complaint   Patient presents with    Procedure     shave bx        Lesion:  · 1. Left shoulder        Indication for Biopsy 1: very dark 1 cm lesion      Procedure: The lesion(s) was cleansed with Alcohol.  2% lidocaine with epinephrine was used for local anaesthesia. A 1 cm  shave was used to obtain a specimen. The specimen(s) was    preserved and sent for pathological examination. The biopsy site(s) was  cleansed and hemostasis using ACl and a pressure dressing(s) was achieved with good results. The patient was instructed on wound care. No complications occurred and the patient tolerated the procedure well. Diagnosis Orders   1. Neoplasm of uncertain behavior of skin  Derm biopsy       Follow Up: Wound care discussed. Keep well moisturized. Watch for signs of infection which would include:  Redness, swelling, fever. If signs appear, please return to office. Return for Will call with results.        Electronically signed by Erika Muhammad PA-C on 7/31/2020 at 9:16 AM

## 2020-08-04 LAB — DERMATOLOGY PATHOLOGY REPORT: NORMAL

## 2020-08-05 ENCOUNTER — TELEPHONE (OUTPATIENT)
Dept: PRIMARY CARE CLINIC | Age: 70
End: 2020-08-05

## 2020-08-05 NOTE — TELEPHONE ENCOUNTER
Pt called back and I gave her the results of her BX. Could not document on it, since you closed it. Thankyou!

## 2020-09-16 ENCOUNTER — OFFICE VISIT (OUTPATIENT)
Dept: PRIMARY CARE CLINIC | Age: 70
End: 2020-09-16
Payer: MEDICARE

## 2020-09-16 VITALS
TEMPERATURE: 98.2 F | DIASTOLIC BLOOD PRESSURE: 62 MMHG | BODY MASS INDEX: 28.39 KG/M2 | WEIGHT: 155.2 LBS | HEART RATE: 86 BPM | OXYGEN SATURATION: 97 % | SYSTOLIC BLOOD PRESSURE: 120 MMHG

## 2020-09-16 LAB
CREATININE URINE POCT: NORMAL
HBA1C MFR BLD: 7.3 %
MICROALBUMIN/CREAT 24H UR: NORMAL MG/G{CREAT}
MICROALBUMIN/CREAT UR-RTO: NORMAL

## 2020-09-16 PROCEDURE — 82044 UR ALBUMIN SEMIQUANTITATIVE: CPT | Performed by: FAMILY MEDICINE

## 2020-09-16 PROCEDURE — G0008 ADMIN INFLUENZA VIRUS VAC: HCPCS | Performed by: FAMILY MEDICINE

## 2020-09-16 PROCEDURE — 90694 VACC AIIV4 NO PRSRV 0.5ML IM: CPT | Performed by: FAMILY MEDICINE

## 2020-09-16 PROCEDURE — G8417 CALC BMI ABV UP PARAM F/U: HCPCS | Performed by: FAMILY MEDICINE

## 2020-09-16 PROCEDURE — 3051F HG A1C>EQUAL 7.0%<8.0%: CPT | Performed by: FAMILY MEDICINE

## 2020-09-16 PROCEDURE — 1036F TOBACCO NON-USER: CPT | Performed by: FAMILY MEDICINE

## 2020-09-16 PROCEDURE — 99213 OFFICE O/P EST LOW 20 MIN: CPT | Performed by: FAMILY MEDICINE

## 2020-09-16 PROCEDURE — G8399 PT W/DXA RESULTS DOCUMENT: HCPCS | Performed by: FAMILY MEDICINE

## 2020-09-16 PROCEDURE — 3017F COLORECTAL CA SCREEN DOC REV: CPT | Performed by: FAMILY MEDICINE

## 2020-09-16 PROCEDURE — 83036 HEMOGLOBIN GLYCOSYLATED A1C: CPT | Performed by: FAMILY MEDICINE

## 2020-09-16 PROCEDURE — 2022F DILAT RTA XM EVC RTNOPTHY: CPT | Performed by: FAMILY MEDICINE

## 2020-09-16 PROCEDURE — 4040F PNEUMOC VAC/ADMIN/RCVD: CPT | Performed by: FAMILY MEDICINE

## 2020-09-16 PROCEDURE — 1090F PRES/ABSN URINE INCON ASSESS: CPT | Performed by: FAMILY MEDICINE

## 2020-09-16 PROCEDURE — 1123F ACP DISCUSS/DSCN MKR DOCD: CPT | Performed by: FAMILY MEDICINE

## 2020-09-16 PROCEDURE — G8427 DOCREV CUR MEDS BY ELIG CLIN: HCPCS | Performed by: FAMILY MEDICINE

## 2020-09-16 ASSESSMENT — ENCOUNTER SYMPTOMS
RHINORRHEA: 0
SHORTNESS OF BREATH: 0
ABDOMINAL PAIN: 0
NAUSEA: 0
DIARRHEA: 1
SORE THROAT: 0
EYE DISCHARGE: 0
COUGH: 0
EYE REDNESS: 0
VOMITING: 0
WHEEZING: 0

## 2020-09-16 NOTE — PROGRESS NOTES
 lisinopril-hydrochlorothiazide (PRINZIDE;ZESTORETIC) 20-25 MG per tablet TAKE ONE TABLET BY MOUTH DAILY 90 tablet 3    Probiotic Product (PROBIOTIC DAILY PO) Take by mouth      aspirin 81 MG EC tablet Take 81 mg by mouth daily.  Flaxseed, Linseed, 1000 MG CAPS Take 1 tablet by mouth.  Multiple Vitamins-Minerals (THERAPEUTIC MULTIVITAMIN-MINERALS) tablet Take 1 tablet by mouth daily. No current facility-administered medications for this visit. No Known Allergies    Health Maintenance   Topic Date Due    DTaP/Tdap/Td vaccine (1 - Tdap) 07/23/1967    Shingles Vaccine (2 of 3) 11/17/2009    Diabetic foot exam  06/04/2019    Lipid screen  12/11/2019    Diabetic microalbuminuria test  04/01/2020    Flu vaccine (1) 09/01/2020    Annual Wellness Visit (AWV)  09/23/2020    Potassium monitoring  12/27/2020    Creatinine monitoring  12/27/2020    A1C test (Diabetic or Prediabetic)  06/08/2021    Diabetic retinal exam  08/11/2021    Breast cancer screen  03/09/2022    Colon cancer screen colonoscopy  03/01/2028    DEXA (modify frequency per FRAX score)  Completed    Pneumococcal 65+ years Vaccine  Completed    Hepatitis C screen  Completed    Hepatitis A vaccine  Aged Out    Hib vaccine  Aged Out    Meningococcal (ACWY) vaccine  Aged Out       Subjective:     Review of Systems   Constitutional: Negative for chills and fever. HENT: Positive for hearing loss and tinnitus. Negative for rhinorrhea and sore throat. Eyes: Negative for discharge and redness. Respiratory: Negative for cough, shortness of breath and wheezing. Cardiovascular: Negative for chest pain and palpitations. Gastrointestinal: Positive for diarrhea. Negative for abdominal pain, nausea and vomiting. Genitourinary: Negative for dysuria and frequency. Musculoskeletal: Negative for arthralgias and myalgias. Neurological: Negative for dizziness, light-headedness and headaches.    Psychiatric/Behavioral: POCT microalbumin     No orders of the defined types were placed in this encounter. Patient given educationalmaterials - see patient instructions. Discussed use, benefit, and side effectsof prescribed medications. All patient questions answered. Pt voiced understanding. Reviewed health maintenance. Instructed to continue current medications, diet andexercise. Patient agreed with treatment plan. Follow up as directed.      Electronicallysigned by Alberto Chavez MD on 9/16/2020 at 1:35 PM

## 2020-11-23 RX ORDER — METFORMIN HYDROCHLORIDE 500 MG/1
500 TABLET, EXTENDED RELEASE ORAL
Qty: 90 TABLET | Refills: 1 | Status: SHIPPED | OUTPATIENT
Start: 2020-11-23 | End: 2021-07-06 | Stop reason: SDUPTHER

## 2020-11-24 RX ORDER — MELOXICAM 15 MG/1
TABLET ORAL
Qty: 90 TABLET | Refills: 2 | Status: SHIPPED | OUTPATIENT
Start: 2020-11-24 | End: 2022-07-27

## 2020-12-23 ENCOUNTER — OFFICE VISIT (OUTPATIENT)
Dept: PRIMARY CARE CLINIC | Age: 70
End: 2020-12-23
Payer: MEDICARE

## 2020-12-23 VITALS
SYSTOLIC BLOOD PRESSURE: 118 MMHG | WEIGHT: 154.4 LBS | BODY MASS INDEX: 28.24 KG/M2 | OXYGEN SATURATION: 97 % | DIASTOLIC BLOOD PRESSURE: 66 MMHG | TEMPERATURE: 97.6 F | HEART RATE: 81 BPM

## 2020-12-23 LAB — HBA1C MFR BLD: 6.4 %

## 2020-12-23 PROCEDURE — G8427 DOCREV CUR MEDS BY ELIG CLIN: HCPCS | Performed by: FAMILY MEDICINE

## 2020-12-23 PROCEDURE — G8484 FLU IMMUNIZE NO ADMIN: HCPCS | Performed by: FAMILY MEDICINE

## 2020-12-23 PROCEDURE — 2022F DILAT RTA XM EVC RTNOPTHY: CPT | Performed by: FAMILY MEDICINE

## 2020-12-23 PROCEDURE — G8399 PT W/DXA RESULTS DOCUMENT: HCPCS | Performed by: FAMILY MEDICINE

## 2020-12-23 PROCEDURE — 83036 HEMOGLOBIN GLYCOSYLATED A1C: CPT | Performed by: FAMILY MEDICINE

## 2020-12-23 PROCEDURE — 99213 OFFICE O/P EST LOW 20 MIN: CPT | Performed by: FAMILY MEDICINE

## 2020-12-23 PROCEDURE — 3017F COLORECTAL CA SCREEN DOC REV: CPT | Performed by: FAMILY MEDICINE

## 2020-12-23 PROCEDURE — 1090F PRES/ABSN URINE INCON ASSESS: CPT | Performed by: FAMILY MEDICINE

## 2020-12-23 PROCEDURE — 1123F ACP DISCUSS/DSCN MKR DOCD: CPT | Performed by: FAMILY MEDICINE

## 2020-12-23 PROCEDURE — 4040F PNEUMOC VAC/ADMIN/RCVD: CPT | Performed by: FAMILY MEDICINE

## 2020-12-23 PROCEDURE — 1036F TOBACCO NON-USER: CPT | Performed by: FAMILY MEDICINE

## 2020-12-23 PROCEDURE — 3044F HG A1C LEVEL LT 7.0%: CPT | Performed by: FAMILY MEDICINE

## 2020-12-23 PROCEDURE — G8417 CALC BMI ABV UP PARAM F/U: HCPCS | Performed by: FAMILY MEDICINE

## 2020-12-23 SDOH — ECONOMIC STABILITY: TRANSPORTATION INSECURITY
IN THE PAST 12 MONTHS, HAS THE LACK OF TRANSPORTATION KEPT YOU FROM MEDICAL APPOINTMENTS OR FROM GETTING MEDICATIONS?: NO

## 2020-12-23 SDOH — ECONOMIC STABILITY: FOOD INSECURITY: WITHIN THE PAST 12 MONTHS, YOU WORRIED THAT YOUR FOOD WOULD RUN OUT BEFORE YOU GOT MONEY TO BUY MORE.: NEVER TRUE

## 2020-12-23 SDOH — ECONOMIC STABILITY: FOOD INSECURITY: WITHIN THE PAST 12 MONTHS, THE FOOD YOU BOUGHT JUST DIDN'T LAST AND YOU DIDN'T HAVE MONEY TO GET MORE.: NEVER TRUE

## 2020-12-23 SDOH — ECONOMIC STABILITY: INCOME INSECURITY: HOW HARD IS IT FOR YOU TO PAY FOR THE VERY BASICS LIKE FOOD, HOUSING, MEDICAL CARE, AND HEATING?: NOT HARD AT ALL

## 2020-12-23 SDOH — ECONOMIC STABILITY: TRANSPORTATION INSECURITY
IN THE PAST 12 MONTHS, HAS LACK OF TRANSPORTATION KEPT YOU FROM MEETINGS, WORK, OR FROM GETTING THINGS NEEDED FOR DAILY LIVING?: NO

## 2020-12-23 ASSESSMENT — PATIENT HEALTH QUESTIONNAIRE - PHQ9
SUM OF ALL RESPONSES TO PHQ QUESTIONS 1-9: 0
1. LITTLE INTEREST OR PLEASURE IN DOING THINGS: 0
SUM OF ALL RESPONSES TO PHQ QUESTIONS 1-9: 0
2. FEELING DOWN, DEPRESSED OR HOPELESS: 0
SUM OF ALL RESPONSES TO PHQ9 QUESTIONS 1 & 2: 0
SUM OF ALL RESPONSES TO PHQ QUESTIONS 1-9: 0

## 2020-12-23 ASSESSMENT — ENCOUNTER SYMPTOMS
DIARRHEA: 1
EYE DISCHARGE: 0
CONSTIPATION: 0
RHINORRHEA: 0
VOMITING: 0
SORE THROAT: 0
NAUSEA: 0
ABDOMINAL PAIN: 1
EYE REDNESS: 0
SHORTNESS OF BREATH: 0
COUGH: 0
WHEEZING: 0

## 2020-12-23 NOTE — PATIENT INSTRUCTIONS
Stop metformin for about 2 weeks and see if stomach gets better. If it does then restart the metformin and see if it gets worse again. If it is not any different then let me know.

## 2020-12-23 NOTE — PROGRESS NOTES
[facility] PRIMARY CARE  [address] Yolanda Hamper  [address] [address]  Dept: [facility] [address] Marie Osborne is a 65 y.o. female who presents today for her medical conditions/complaintsas noted below. Marjorie Marques is c/o of   Chief Complaint   Patient presents with    Hypertension     Patient doesn't check bp at home    Diabetes     Patient checks bs at times    Medication Check       HPI:     HPI  Sugars at home in morning about 140-160. Not tolerating the metformin very well. Makes what she eats not tolerated as well. Worse at night after dinner. Gets a stomach ache. Refills taken care of earlier with my chart.      Hemoglobin A1C (%)   Date Value   12/23/2020 6.4   09/16/2020 7.3   06/08/2020 7.0             ( goal A1C is < 7)   No results found for: LABMICR  LDL Cholesterol (mg/dL)   Date Value   12/11/2018 85     LDL Calculated (mg/dL)   Date Value   11/20/2017 78   12/15/2016 91       (goal LDL is <100)   AST (U/L)   Date Value   12/27/2019 15     ALT (U/L)   Date Value   12/27/2019 16     BUN (mg/dL)   Date Value   12/27/2019 25 (H)     BP Readings from Last 3 Encounters:   12/23/20 118/66   09/16/20 120/62   07/31/20 122/78          (goal 140/90)    Past Medical History:   Diagnosis Date    H/O neoplasm of uncertain behavior of skin     Hyperglycemia 11/8/2015    Hypertension     Stye     right eye        Social History     Tobacco Use    Smoking status: Never Smoker    Smokeless tobacco: Never Used   Substance Use Topics    Alcohol use: Yes     Frequency: 2-4 times a month      Current Outpatient Medications   Medication Sig Dispense Refill    meloxicam (MOBIC) 15 MG tablet Take 1 tablet by mouth daily as needed for pain 90 tablet 2    metFORMIN (GLUCOPHAGE-XR) 500 MG extended release tablet Take 1 tablet by mouth daily (with breakfast) 90 tablet 1  blood glucose test strips (TRUE METRIX BLOOD GLUCOSE TEST) strip 1 each by Miscell. (Med.Supl.;Non-Drugs) route daily 100 each 3    lisinopril-hydrochlorothiazide (PRINZIDE;ZESTORETIC) 20-25 MG per tablet TAKE ONE TABLET BY MOUTH DAILY 90 tablet 3    Probiotic Product (PROBIOTIC DAILY PO) Take by mouth      aspirin 81 MG EC tablet Take 81 mg by mouth daily.  Flaxseed, Linseed, 1000 MG CAPS Take 1 tablet by mouth.  Multiple Vitamins-Minerals (THERAPEUTIC MULTIVITAMIN-MINERALS) tablet Take 1 tablet by mouth daily. No current facility-administered medications for this visit. No Known Allergies    Health Maintenance   Topic Date Due    DTaP/Tdap/Td vaccine (1 - Tdap) 07/23/1967    Annual Wellness Visit (AWV)  05/29/2019    Diabetic foot exam  06/04/2019    Lipid screen  12/11/2019    Shingles Vaccine (3 of 3) 11/20/2020    Potassium monitoring  12/27/2020    Creatinine monitoring  12/27/2020    Diabetic retinal exam  08/11/2021    Diabetic microalbuminuria test  09/16/2021    A1C test (Diabetic or Prediabetic)  12/23/2021    Breast cancer screen  03/09/2022    Colon cancer screen colonoscopy  03/01/2028    DEXA (modify frequency per FRAX score)  Completed    Flu vaccine  Completed    Pneumococcal 65+ years Vaccine  Completed    Hepatitis C screen  Completed    Hepatitis A vaccine  Aged Out    Hib vaccine  Aged Out    Meningococcal (ACWY) vaccine  Aged Out       Subjective:     Review of Systems   Constitutional: Negative for chills and fever. HENT: Negative for rhinorrhea and sore throat. Eyes: Negative for discharge and redness. Respiratory: Negative for cough, shortness of breath and wheezing. Cardiovascular: Negative for chest pain and palpitations. Gastrointestinal: Positive for abdominal pain (stomach ache) and diarrhea. Negative for constipation, nausea and vomiting. Genitourinary: Negative for dysuria and frequency. Musculoskeletal: Negative for arthralgias and myalgias. Neurological: Negative for dizziness, light-headedness and headaches. Psychiatric/Behavioral: Negative for sleep disturbance. Objective:     /66   Pulse 81   Temp 97.6 °F (36.4 °C)   Wt 154 lb 6.4 oz (70 kg)   SpO2 97%   BMI 28.24 kg/m²   Physical Exam  Vitals signs and nursing note reviewed. Constitutional:       General: She is not in acute distress. Appearance: She is well-developed. She is not ill-appearing. HENT:      Head: Normocephalic and atraumatic. Right Ear: External ear normal.      Left Ear: External ear normal.   Eyes:      General: No scleral icterus. Right eye: No discharge. Left eye: No discharge. Conjunctiva/sclera: Conjunctivae normal.      Pupils: Pupils are equal, round, and reactive to light. Neck:      Thyroid: No thyromegaly. Trachea: No tracheal deviation. Cardiovascular:      Rate and Rhythm: Normal rate and regular rhythm. Heart sounds: Normal heart sounds. Pulmonary:      Effort: Pulmonary effort is normal. No respiratory distress. Breath sounds: Normal breath sounds. No wheezing. Lymphadenopathy:      Cervical: No cervical adenopathy. Skin:     General: Skin is warm. Findings: No rash. Neurological:      Mental Status: She is alert and oriented to person, place, and time. Psychiatric:         Mood and Affect: Mood normal.         Behavior: Behavior normal.         Thought Content: Thought content normal.         Assessment:       Diagnosis Orders   1. Type 2 diabetes mellitus without complication, without long-term current use of insulin ([facility])  POCT glycosylated hemoglobin (Hb A1C)   2.  Essential hypertension     3. Epigastric pain  US GALLBLADDER RUQ        Plan: Stop metformin for about 2 weeks and see if stomach gets better. If it does then restart the metformin and see if it gets worse again. If it is not any different then let me know. Return in about 3 months (around 3/23/2021) for HTN f/u, DM check. Orders Placed This Encounter   Procedures    US GALLBLADDER RUQ     This procedure can be scheduled via [employer]. Access your [employer] account by visiting [email or web address]. Standing Status:   Future     Standing Expiration Date:   12/23/2021    POCT glycosylated hemoglobin (Hb A1C)     No orders of the defined types were placed in this encounter. Patient given educationalmaterials - see patient instructions. Discussed use, benefit, and side effectsof prescribed medications. All patient questions answered. Pt voiced understanding. Reviewed health maintenance. Instructed to continue current medications, diet andexercise. Patient agreed with treatment plan. Follow up as directed.      Electronicallysigned by Nestor Ivey MD on 12/23/2020 at 1:18 PM

## 2020-12-24 ENCOUNTER — HOSPITAL ENCOUNTER (OUTPATIENT)
Dept: ULTRASOUND IMAGING | Age: 70
Discharge: HOME | End: 2020-12-26
Payer: MEDICARE

## 2020-12-24 PROCEDURE — 76705 ECHO EXAM OF ABDOMEN: CPT

## 2020-12-30 ENCOUNTER — PATIENT MESSAGE (OUTPATIENT)
Dept: PRIMARY CARE CLINIC | Age: 70
End: 2020-12-30

## 2020-12-30 NOTE — TELEPHONE ENCOUNTER
From: Roslyn Cowan  To: Pauline Trujillo MD  Sent: 12/30/2020 2:44 PM EST  Subject: Non-Urgent Medical Question    MRI scheduled for 1/7/21. I need to have blood work done prior. Is this something I can get done at the office or do I need to go to [facility]? Last blood work was a year ago. Thank you.

## 2021-01-04 ENCOUNTER — HOSPITAL ENCOUNTER (OUTPATIENT)
Age: 71
Discharge: HOME | End: 2021-01-04
Payer: MEDICARE

## 2021-01-04 DIAGNOSIS — E11.9 TYPE 2 DIABETES MELLITUS WITHOUT COMPLICATION, WITHOUT LONG-TERM CURRENT USE OF INSULIN (HCC): ICD-10-CM

## 2021-01-04 DIAGNOSIS — I10 ESSENTIAL HYPERTENSION: ICD-10-CM

## 2021-01-04 LAB
ABSOLUTE EOS #: 0.1 K/UL (ref 0–0.4)
ABSOLUTE IMMATURE GRANULOCYTE: ABNORMAL K/UL (ref 0–0.3)
ABSOLUTE LYMPH #: 1.8 K/UL (ref 1–4.8)
ABSOLUTE MONO #: 0.5 K/UL (ref 0.1–1.3)
ALBUMIN SERPL-MCNC: 4.3 G/DL (ref 3.5–5.2)
ALBUMIN/GLOBULIN RATIO: ABNORMAL (ref 1–2.5)
ALP BLD-CCNC: 74 U/L (ref 35–104)
ALT SERPL-CCNC: 14 U/L (ref 5–33)
ANION GAP SERPL CALCULATED.3IONS-SCNC: 11 MMOL/L (ref 9–17)
AST SERPL-CCNC: 13 U/L
BASOPHILS # BLD: 1 % (ref 0–2)
BASOPHILS ABSOLUTE: 0 K/UL (ref 0–0.2)
BILIRUB SERPL-MCNC: 0.78 MG/DL (ref 0.3–1.2)
BUN BLDV-MCNC: 26 MG/DL (ref 8–23)
BUN/CREAT BLD: ABNORMAL (ref 9–20)
CALCIUM SERPL-MCNC: 9.7 MG/DL (ref 8.6–10.4)
CHLORIDE BLD-SCNC: 99 MMOL/L (ref 98–107)
CO2: 27 MMOL/L (ref 20–31)
CREAT SERPL-MCNC: 0.85 MG/DL (ref 0.5–0.9)
DIFFERENTIAL TYPE: ABNORMAL
EOSINOPHILS RELATIVE PERCENT: 1 % (ref 0–4)
GFR AFRICAN AMERICAN: >60 ML/MIN
GFR NON-AFRICAN AMERICAN: >60 ML/MIN
GFR SERPL CREATININE-BSD FRML MDRD: ABNORMAL ML/MIN/{1.73_M2}
GFR SERPL CREATININE-BSD FRML MDRD: ABNORMAL ML/MIN/{1.73_M2}
GLUCOSE BLD-MCNC: 178 MG/DL (ref 70–99)
HCT VFR BLD CALC: 39 % (ref 36–46)
HEMOGLOBIN: 13.3 G/DL (ref 12–16)
IMMATURE GRANULOCYTES: ABNORMAL %
LYMPHOCYTES # BLD: 28 % (ref 24–44)
MCH RBC QN AUTO: 30.9 PG (ref 26–34)
MCHC RBC AUTO-ENTMCNC: 34.2 G/DL (ref 31–37)
MCV RBC AUTO: 90.5 FL (ref 80–100)
MONOCYTES # BLD: 8 % (ref 1–7)
NRBC AUTOMATED: ABNORMAL PER 100 WBC
PDW BLD-RTO: 12.6 % (ref 11.5–14.9)
PLATELET # BLD: 186 K/UL (ref 150–450)
PLATELET ESTIMATE: ABNORMAL
PMV BLD AUTO: 7.2 FL (ref 6–12)
POTASSIUM SERPL-SCNC: 3.7 MMOL/L (ref 3.7–5.3)
RBC # BLD: 4.3 M/UL (ref 4–5.2)
RBC # BLD: ABNORMAL 10*6/UL
SEG NEUTROPHILS: 62 % (ref 36–66)
SEGMENTED NEUTROPHILS ABSOLUTE COUNT: 4 K/UL (ref 1.3–9.1)
SODIUM BLD-SCNC: 137 MMOL/L (ref 135–144)
TOTAL PROTEIN: 7.5 G/DL (ref 6.4–8.3)
WBC # BLD: 6.4 K/UL (ref 3.5–11)
WBC # BLD: ABNORMAL 10*3/UL

## 2021-01-04 PROCEDURE — 85025 COMPLETE CBC W/AUTO DIFF WBC: CPT

## 2021-01-04 PROCEDURE — 36415 COLL VENOUS BLD VENIPUNCTURE: CPT

## 2021-01-04 PROCEDURE — 80053 COMPREHEN METABOLIC PANEL: CPT

## 2021-01-07 ENCOUNTER — HOSPITAL ENCOUNTER (OUTPATIENT)
Dept: MRI IMAGING | Facility: CLINIC | Age: 71
Discharge: HOME | End: 2021-01-09
Payer: MEDICARE

## 2021-01-07 DIAGNOSIS — K83.8 DILATED BILE DUCT: ICD-10-CM

## 2021-01-07 PROCEDURE — A9579 GAD-BASE MR CONTRAST NOS,1ML: HCPCS | Performed by: FAMILY MEDICINE

## 2021-01-07 PROCEDURE — 2580000003 HC RX 258: Performed by: FAMILY MEDICINE

## 2021-01-07 PROCEDURE — 74183 MRI ABD W/O CNTR FLWD CNTR: CPT

## 2021-01-07 PROCEDURE — 6360000004 HC RX CONTRAST MEDICATION: Performed by: FAMILY MEDICINE

## 2021-01-07 RX ORDER — SODIUM CHLORIDE 0.9 % (FLUSH) 0.9 %
10 SYRINGE (ML) INJECTION PRN
Status: DISCONTINUED | OUTPATIENT
Start: 2021-01-07 | End: 2021-01-10 | Stop reason: HOSPADM

## 2021-01-07 RX ADMIN — GADOTERIDOL 15 ML: 279.3 INJECTION, SOLUTION INTRAVENOUS at 12:24

## 2021-01-07 RX ADMIN — Medication 10 ML: at 12:24

## 2021-01-21 ENCOUNTER — HOSPITAL ENCOUNTER (OUTPATIENT)
Dept: PREADMISSION TESTING | Age: 71
Discharge: HOME | End: 2021-01-25
Payer: MEDICARE

## 2021-01-21 VITALS
WEIGHT: 155 LBS | TEMPERATURE: 98 F | DIASTOLIC BLOOD PRESSURE: 64 MMHG | OXYGEN SATURATION: 99 % | SYSTOLIC BLOOD PRESSURE: 131 MMHG | HEIGHT: 62 IN | BODY MASS INDEX: 28.52 KG/M2 | HEART RATE: 94 BPM

## 2021-01-21 LAB
ABSOLUTE EOS #: 0.1 K/UL (ref 0–0.4)
ABSOLUTE IMMATURE GRANULOCYTE: ABNORMAL K/UL (ref 0–0.3)
ABSOLUTE LYMPH #: 1.4 K/UL (ref 1–4.8)
ABSOLUTE MONO #: 0.3 K/UL (ref 0.1–1.3)
ALBUMIN SERPL-MCNC: 4.5 G/DL (ref 3.5–5.2)
ALBUMIN/GLOBULIN RATIO: ABNORMAL (ref 1–2.5)
ALP BLD-CCNC: 87 U/L (ref 35–104)
ALT SERPL-CCNC: 25 U/L (ref 5–33)
AMYLASE: 76 U/L (ref 28–100)
ANION GAP SERPL CALCULATED.3IONS-SCNC: 11 MMOL/L (ref 9–17)
AST SERPL-CCNC: 25 U/L
BASOPHILS # BLD: 1 % (ref 0–2)
BASOPHILS ABSOLUTE: 0.1 K/UL (ref 0–0.2)
BILIRUB SERPL-MCNC: 0.41 MG/DL (ref 0.3–1.2)
BUN BLDV-MCNC: 29 MG/DL (ref 8–23)
BUN/CREAT BLD: ABNORMAL (ref 9–20)
CALCIUM SERPL-MCNC: 9.9 MG/DL (ref 8.6–10.4)
CHLORIDE BLD-SCNC: 99 MMOL/L (ref 98–107)
CO2: 29 MMOL/L (ref 20–31)
CREAT SERPL-MCNC: 0.95 MG/DL (ref 0.5–0.9)
DIFFERENTIAL TYPE: ABNORMAL
EOSINOPHILS RELATIVE PERCENT: 1 % (ref 0–4)
GFR AFRICAN AMERICAN: >60 ML/MIN
GFR NON-AFRICAN AMERICAN: 58 ML/MIN
GFR SERPL CREATININE-BSD FRML MDRD: ABNORMAL ML/MIN/{1.73_M2}
GFR SERPL CREATININE-BSD FRML MDRD: ABNORMAL ML/MIN/{1.73_M2}
GLUCOSE BLD-MCNC: 165 MG/DL (ref 70–99)
HCT VFR BLD CALC: 39 % (ref 36–46)
HEMOGLOBIN: 13.5 G/DL (ref 12–16)
IMMATURE GRANULOCYTES: ABNORMAL %
LIPASE: 39 U/L (ref 13–60)
LYMPHOCYTES # BLD: 24 % (ref 24–44)
MCH RBC QN AUTO: 31.4 PG (ref 26–34)
MCHC RBC AUTO-ENTMCNC: 34.7 G/DL (ref 31–37)
MCV RBC AUTO: 90.4 FL (ref 80–100)
MONOCYTES # BLD: 6 % (ref 1–7)
NRBC AUTOMATED: ABNORMAL PER 100 WBC
PDW BLD-RTO: 12.5 % (ref 11.5–14.9)
PLATELET # BLD: 230 K/UL (ref 150–450)
PLATELET ESTIMATE: ABNORMAL
PMV BLD AUTO: 8.5 FL (ref 6–12)
POTASSIUM SERPL-SCNC: 4.2 MMOL/L (ref 3.7–5.3)
RBC # BLD: 4.31 M/UL (ref 4–5.2)
RBC # BLD: ABNORMAL 10*6/UL
SEG NEUTROPHILS: 68 % (ref 36–66)
SEGMENTED NEUTROPHILS ABSOLUTE COUNT: 4.1 K/UL (ref 1.3–9.1)
SODIUM BLD-SCNC: 139 MMOL/L (ref 135–144)
TOTAL PROTEIN: 7.7 G/DL (ref 6.4–8.3)
WBC # BLD: 6 K/UL (ref 3.5–11)
WBC # BLD: ABNORMAL 10*3/UL

## 2021-01-21 PROCEDURE — 85025 COMPLETE CBC W/AUTO DIFF WBC: CPT

## 2021-01-21 PROCEDURE — 82150 ASSAY OF AMYLASE: CPT

## 2021-01-21 PROCEDURE — 93005 ELECTROCARDIOGRAM TRACING: CPT | Performed by: SURGERY

## 2021-01-21 PROCEDURE — 36415 COLL VENOUS BLD VENIPUNCTURE: CPT

## 2021-01-21 PROCEDURE — 80053 COMPREHEN METABOLIC PANEL: CPT

## 2021-01-21 PROCEDURE — 83690 ASSAY OF LIPASE: CPT

## 2021-01-21 NOTE — PROGRESS NOTES
Dr. Alden Ch, anesthesia, was contacted and informed of the patient's planned surgery, history, and unconfirmed abnormal EKG results from EKG done today in [facility]. No clearance required.

## 2021-01-21 NOTE — H&P (VIEW-ONLY)
HISTORY and Sherin Laughlin [address]       NAME:  Renea Cash  MRN: [id number]   YOB: 1950   Date: 1/21/2021   Age: 65 y.o. Gender: female       COMPLAINT AND PRESENT HISTORY:     Renea Cash is 65 y.o.,   female, undergoing for Robotic Laparoscopic Cholecystectomy. Pt has Cholelithiasis. Gall bladder ultrasound showed a 14 mm shadowing gall stone. Patient had complaints of persistent daily  diarrhea with some indigestion. Patient states that sometimes she will go up to 3 times a day. Patient denies any GI or rectal bleeding. Patient will be going for Colonoscopy one day prior to surgery. Patient has hx of colon polyp in 2015 and previous procedure in 2018. Pt complains of RUQ and mild Epigastric pains not  related to meals or bowel movements. Symptoms started 6 months ago. Pt reports that she has intolerance to greasy and spicy foods. Patient denies any  complains of nausea, no vomiting. No constipation. No change in the color of the stools. Any significant medical hx: Hypertension, hyper glycemia/DM2 managed with medications, last A1c was 6.4. Any anticoagulants or blood thinners: Aspirin    No chest pain, palpitations or diaphoresis. No recent URI, SOB, fever or chills. RIGHT UPPER QUADRANT ULTRASOUND     12/24/2020 9:35 am     COMPARISON:  None.     HISTORY:  ORDERING SYSTEM PROVIDED HISTORY: Epigastric pain  TECHNOLOGIST PROVIDED HISTORY:  This procedure can be scheduled via [employer].  Access your [employer] account by  visiting [employer]. Acuity: Acute  Type of Exam: Initial     FINDINGS:  LIVER:  The liver demonstrates normal echogenicity without evidence of  intrahepatic biliary ductal dilatation.  Liver length is 16.1 cm.  By  ultrasound no focal liver masses are seen.  Flow in the portal vein is  hepatopetal.     BILIARY SYSTEM:  The gallbladder contains a 14 mm shadowing gallstone. Currently no sonographic findings of acute cholecystitis.  There is no wall  thickening or pericholecystic fluid.  The patient did not have a positive  sonographic Nuñez's sign.     Common bile duct is mildly prominent measuring up to 7.4 mm.     RIGHT KIDNEY: The right kidney is grossly unremarkable without evidence of  hydronephrosis.     PANCREAS:  Visualized portions of the pancreas are unremarkable.     OTHER: No evidence of right upper quadrant ascites.     Impression  Cholelithiasis.   Mildly prominent common bile duct without intrahepatic biliary dilatation  visualized.  Correlate with clinical and laboratory findings to determine  need for additional imaging such as MRCP.     PAST MEDICAL HISTORY     Past Medical History:   Diagnosis Date    Diabetes mellitus ([facility] [address]     Diarrhea     H/O neoplasm of uncertain behavior of skin     Hyperglycemia 11/8/2015    Hypertension     Stye     right eye    Wears glasses          SURGICAL HISTORY       Past Surgical History:   Procedure Laterality Date    BREAST BIOPSY Right     CARDIAC CATHETERIZATION  5/07/2014    DILATION AND CURETTAGE OF UTERUS      SKIN BIOPSY      TONSILLECTOMY      WISDOM TOOTH EXTRACTION         FAMILY HISTORY       Family History   Problem Relation Age of Onset    Diabetes Mother     High Blood Pressure Mother     Heart Disease Father     High Blood Pressure Father     Parkinsonism Father     Cancer Brother         melanoma- metastaic    Hypertension Brother     Other Brother         neuro degenerative disease    Mult Sclerosis Nephew        SOCIAL HISTORY       Social History     Socioeconomic History    Marital status: Single     Spouse name: None    Number of children: None    Years of education: None    Highest education level: None   Occupational History    None   Social Needs    Financial resource strain: Not hard at all   Robbi-Srinivasa insecurity     Worry: Never true     Inability: Never true  Transportation needs     Medical: No     Non-medical: No   Tobacco Use    Smoking status: Never Smoker    Smokeless tobacco: Never Used   Substance and Sexual Activity    Alcohol use: Yes     Frequency: 2-4 times a month     Comment: rare    Drug use: Never    Sexual activity: None   Lifestyle    Physical activity     Days per week: None     Minutes per session: None    Stress: None   Relationships    Social connections     Talks on phone: None     Gets together: None     Attends Shinto service: None     Active member of club or organization: None     Attends meetings of clubs or organizations: None     Relationship status: None    Intimate partner violence     Fear of current or ex partner: None     Emotionally abused: None     Physically abused: None     Forced sexual activity: None   Other Topics Concern    None   Social History Narrative    None           REVIEW OF SYSTEMS      No Known Allergies    Current Outpatient Medications on File Prior to Encounter   Medication Sig Dispense Refill    meloxicam (MOBIC) 15 MG tablet Take 1 tablet by mouth daily as needed for pain 90 tablet 2    metFORMIN (GLUCOPHAGE-XR) 500 MG extended release tablet Take 1 tablet by mouth daily (with breakfast) 90 tablet 1    lisinopril-hydrochlorothiazide (PRINZIDE;ZESTORETIC) 20-25 MG per tablet TAKE ONE TABLET BY MOUTH DAILY (Patient taking differently: nightly TAKE ONE TABLET BY MOUTH DAILY) 90 tablet 3    Probiotic Product (PROBIOTIC DAILY PO) Take by mouth daily       aspirin 81 MG EC tablet Take 81 mg by mouth daily.  Flaxseed, Linseed, 1000 MG CAPS Take 1 tablet by mouth.  Multiple Vitamins-Minerals (THERAPEUTIC MULTIVITAMIN-MINERALS) tablet Take 1 tablet by mouth daily.  blood glucose test strips (TRUE METRIX BLOOD GLUCOSE TEST) strip 1 each by Miscell. (Med.Supl.;Non-Drugs) route daily 100 each 3     No current facility-administered medications on file prior to encounter. General health:  Fairly good. No fever or chills. Skin:  No itching, redness or rash. HEENT:  No headache, epistaxis or sore throat. Neck:  No pain, stiffness or masses. Cardiovascular/Respiratory system:  No chest pain, palpitation or shortness of breath. Gastrointestinal tract: See HPI. Genitourinary:  No burning on micturition. No hesitancy, urgency, frequency or discoloration of urine. Locomotor:  No bone or joint pains. No swelling. Neuropsychiatric:  No referable complaints. GENERAL PHYSICAL EXAM:     Vitals: /64   Pulse 94   Temp 98 °F (36.7 °C)   Ht 5' 2\" (1.575 m)   Wt 155 lb (70.3 kg)   SpO2 99%   BMI 28.35 kg/m²  Body mass index is 28.35 kg/m². GENERAL APPEARANCE:   Gael Ho is 65 y.o.,  female, moderately obese, nourished, conscious, alert. Does not appear to be distress or pain at this time. SKIN:  Warm, dry, no cyanosis or jaundice. HEAD:  Normocephalic, atraumatic, no swelling or tenderness. EYES:  Pupils equal, reactive to light. EARS:  No discharge, no marked hearing loss. NOSE:  No rhinorrhea, epistaxis or septal deformity. THROAT:  Not congested. No ulceration bleeding or discharge. NECK:  No stiffness, trachea central.                  CHEST:  Symmetrical and equal on expansion. HEART:  RRR S1 > S2. No audible murmurs or gallops. LUNGS:  Equal on expansion, normal breath sounds. No adventitious sounds. ABDOMEN:  Obese. Soft on palpation. No localized tenderness. No guarding or rigidity. No palpable hepatosplenomegaly. LYMPHATICS:  No palpable cervical lymphadenopathy. LOCOMOTOR, BACK AND SPINE:  No tenderness or deformities. EXTREMITIES:  Symmetrical, no pretibial edema. Lilians sign negative or no calf tenderness. No discoloration or ulcerations. NEUROLOGIC:  The patient is conscious, alert, oriented,Cranial nerve II-XII intact, taste and smell were not examined. No apparent focal sensory or motor deficits.                                                                                      PROVISIONAL DIAGNOSES / SURGERY:      CHOLELITHIASIS    CHOLECYSTECTOMY LAPAROSCOPIC ROBOTIC XI    Patient Active Problem List    Diagnosis Date Noted    History of colonic polyps 07/13/2020    Family history of melanoma 07/13/2020    Osteopenia of multiple sites 11/21/2019    Actinic keratoses 05/14/2018    Type 2 diabetes mellitus without complication, without long-term current use of insulin ([facility] [address] 02/26/2018    Problems with hearing 11/17/2016    Allergic rhinitis 11/08/2015    Cherry angioma 11/08/2015    Fatigue 11/08/2015    Lentigines 11/08/2015    Hypertension 11/08/2015    Sciatica 11/08/2015    Seborrheic keratoses 11/08/2015           NEVILLE PERKINS, SURINDER - CNP on 1/21/2021 at 3:28 PM

## 2021-01-21 NOTE — H&P (VIEW-ONLY)
HISTORY and [facility]       NAME:  Danielle Angel  MRN: [id number]   YOB: 1950   Date: 1/21/2021   Age: 65 y.o. Gender: female       COMPLAINT AND PRESENT HISTORY:     Danielle Angel is 65 y.o.,   female, undergoing for Robotic Laparoscopic Cholecystectomy. Pt has Cholelithiasis. Gall bladder ultrasound showed a 14 mm shadowing gall stone. Patient had complaints of persistent daily  diarrhea with some indigestion. Patient states that sometimes she will go up to 3 times a day. Patient denies any GI or rectal bleeding. Patient will be going for Colonoscopy one day prior to surgery. Patient has hx of colon polyp in 2015 and previous procedure in 2018. Pt complains of RUQ and mild Epigastric pains not  related to meals or bowel movements. Symptoms started 6 months ago. Pt reports that she has intolerance to greasy and spicy foods. Patient denies any  complains of nausea, no vomiting. No constipation. No change in the color of the stools. Any significant medical hx: Hypertension, hyper glycemia/DM2 managed with medications, last A1c was 6.4. Any anticoagulants or blood thinners: Aspirin    No chest pain, palpitations or diaphoresis. No recent URI, SOB, fever or chills. RIGHT UPPER QUADRANT ULTRASOUND     12/24/2020 9:35 am     COMPARISON:  None.     HISTORY:  ORDERING SYSTEM PROVIDED HISTORY: Epigastric pain  TECHNOLOGIST PROVIDED HISTORY:  This procedure can be scheduled via [employer].  Access your [employer] account by  visiting [employer]. Acuity: Acute  Type of Exam: Initial     FINDINGS:  LIVER:  The liver demonstrates normal echogenicity without evidence of  intrahepatic biliary ductal dilatation.  Liver length is 16.1 cm.  By  ultrasound no focal liver masses are seen.  Flow in the portal vein is  hepatopetal.     BILIARY SYSTEM:  The gallbladder contains a 14 mm shadowing gallstone.  Transportation needs     Medical: No     Non-medical: No   Tobacco Use    Smoking status: Never Smoker    Smokeless tobacco: Never Used   Substance and Sexual Activity    Alcohol use: Yes     Frequency: 2-4 times a month     Comment: rare    Drug use: Never    Sexual activity: None   Lifestyle    Physical activity     Days per week: None     Minutes per session: None    Stress: None   Relationships    Social connections     Talks on phone: None     Gets together: None     Attends [employer] service: None     Active member of club or organization: None     Attends meetings of clubs or organizations: None     Relationship status: None    Intimate partner violence     Fear of current or ex partner: None     Emotionally abused: None     Physically abused: None     Forced sexual activity: None   Other Topics Concern    None   Social History Narrative    None           REVIEW OF SYSTEMS      No Known Allergies    Current Outpatient Medications on File Prior to Encounter   Medication Sig Dispense Refill    meloxicam (MOBIC) 15 MG tablet Take 1 tablet by mouth daily as needed for pain 90 tablet 2    metFORMIN (GLUCOPHAGE-XR) 500 MG extended release tablet Take 1 tablet by mouth daily (with breakfast) 90 tablet 1    lisinopril-hydrochlorothiazide (PRINZIDE;ZESTORETIC) 20-25 MG per tablet TAKE ONE TABLET BY MOUTH DAILY (Patient taking differently: nightly TAKE ONE TABLET BY MOUTH DAILY) 90 tablet 3    Probiotic Product (PROBIOTIC DAILY PO) Take by mouth daily       aspirin 81 MG EC tablet Take 81 mg by mouth daily.  Flaxseed, Linseed, 1000 MG CAPS Take 1 tablet by mouth.  Multiple Vitamins-Minerals (THERAPEUTIC MULTIVITAMIN-MINERALS) tablet Take 1 tablet by mouth daily.  blood glucose test strips (TRUE METRIX BLOOD GLUCOSE TEST) strip 1 each by Miscell. (Med.Supl.;Non-Drugs) route daily 100 each 3     No current facility-administered medications on file prior to encounter. General health:  Fairly good. No fever or chills. Skin:  No itching, redness or rash. HEENT:  No headache, epistaxis or sore throat. Neck:  No pain, stiffness or masses. Cardiovascular/Respiratory system:  No chest pain, palpitation or shortness of breath. Gastrointestinal tract: See HPI. Genitourinary:  No burning on micturition. No hesitancy, urgency, frequency or discoloration of urine. Locomotor:  No bone or joint pains. No swelling. Neuropsychiatric:  No referable complaints. GENERAL PHYSICAL EXAM:     Vitals: /64   Pulse 94   Temp 98 °F (36.7 °C)   Ht 5' 2\" (1.575 m)   Wt 155 lb (70.3 kg)   SpO2 99%   BMI 28.35 kg/m²  Body mass index is 28.35 kg/m². GENERAL APPEARANCE:   John Brian is 65 y.o.,  female, moderately obese, nourished, conscious, alert. Does not appear to be distress or pain at this time. SKIN:  Warm, dry, no cyanosis or jaundice. HEAD:  Normocephalic, atraumatic, no swelling or tenderness. EYES:  Pupils equal, reactive to light. EARS:  No discharge, no marked hearing loss. NOSE:  No rhinorrhea, epistaxis or septal deformity. THROAT:  Not congested. No ulceration bleeding or discharge. NECK:  No stiffness, trachea central.                  CHEST:  Symmetrical and equal on expansion. HEART:  RRR S1 > S2. No audible murmurs or gallops. LUNGS:  Equal on expansion, normal breath sounds. No adventitious sounds. ABDOMEN:  Obese. Soft on palpation. No localized tenderness. No guarding or rigidity. No palpable hepatosplenomegaly. LYMPHATICS:  No palpable cervical lymphadenopathy. LOCOMOTOR, BACK AND SPINE:  No tenderness or deformities. EXTREMITIES:  Symmetrical, no pretibial edema. Lilians sign negative or no calf tenderness. No discoloration or ulcerations. NEUROLOGIC:  The patient is conscious, alert, oriented,Cranial nerve II-XII intact, taste and smell were not examined. No apparent focal sensory or motor deficits.                                                                                      PROVISIONAL DIAGNOSES / SURGERY:      CHOLELITHIASIS    CHOLECYSTECTOMY LAPAROSCOPIC ROBOTIC XI    Patient Active Problem List    Diagnosis Date Noted    History of colonic polyps 07/13/2020    Family history of melanoma 07/13/2020    Osteopenia of multiple sites 11/21/2019    Actinic keratoses 05/14/2018    Type 2 diabetes mellitus without complication, without long-term current use of insulin ([facility] [address] 02/26/2018    Problems with hearing 11/17/2016    Allergic rhinitis 11/08/2015    Cherry angioma 11/08/2015    Fatigue 11/08/2015    Lentigines 11/08/2015    Hypertension 11/08/2015    Sciatica 11/08/2015    Seborrheic keratoses 11/08/2015           NEVILLE PERKINS, SURINDER - CNP on 1/21/2021 at 3:28 PM

## 2021-01-21 NOTE — H&P
HISTORY and Sherin L[facility]       NAME:  Cece Hood  MRN: [id number]   YOB: 1950   Date: 1/21/2021   Age: 65 y.o. Gender: female       COMPLAINT AND PRESENT HISTORY:     Cece Hood is 65 y.o.,   female, undergoing for Robotic Laparoscopic Cholecystectomy. Pt has Cholelithiasis. Gall bladder ultrasound showed a 14 mm shadowing gall stone. Patient had complaints of persistent daily  diarrhea with some indigestion. Patient states that sometimes she will go up to 3 times a day. Patient denies any GI or rectal bleeding. Patient will be going for Colonoscopy one day prior to surgery. Patient has hx of colon polyp in 2015 and previous procedure in 2018. Pt complains of RUQ and mild Epigastric pains not  related to meals or bowel movements. Symptoms started 6 months ago. Pt reports that she has intolerance to greasy and spicy foods. Patient denies any  complains of nausea, no vomiting. No constipation. No change in the color of the stools. Any significant medical hx: Hypertension, hyper glycemia/DM2 managed with medications, last A1c was 6.4. Any anticoagulants or blood thinners: Aspirin    No chest pain, palpitations or diaphoresis. No recent URI, SOB, fever or chills. RIGHT UPPER QUADRANT ULTRASOUND     12/24/2020 9:35 am     COMPARISON:  None.     HISTORY:  ORDERING SYSTEM PROVIDED HISTORY: Epigastric pain  TECHNOLOGIST PROVIDED HISTORY:  This procedure can be scheduled via [employer].  Access your [employer] account by  visiting [employer]. Acuity: Acute  Type of Exam: Initial     FINDINGS:  LIVER:  The liver demonstrates normal echogenicity without evidence of  intrahepatic biliary ductal dilatation.  Liver length is 16.1 cm.  By  ultrasound no focal liver masses are seen.  Flow in the portal vein is  hepatopetal.     BILIARY SYSTEM:  The gallbladder contains a 14 mm shadowing gallstone.  Transportation needs     Medical: No     Non-medical: No   Tobacco Use    Smoking status: Never Smoker    Smokeless tobacco: Never Used   Substance and Sexual Activity    Alcohol use: Yes     Frequency: 2-4 times a month     Comment: rare    Drug use: Never    Sexual activity: None   Lifestyle    Physical activity     Days per week: None     Minutes per session: None    Stress: None   Relationships    Social connections     Talks on phone: None     Gets together: None     Attends Episcopal service: None     Active member of club or organization: None     Attends meetings of clubs or organizations: None     Relationship status: None    Intimate partner violence     Fear of current or ex partner: None     Emotionally abused: None     Physically abused: None     Forced sexual activity: None   Other Topics Concern    None   Social History Narrative    None           REVIEW OF SYSTEMS      No Known Allergies    Current Outpatient Medications on File Prior to Encounter   Medication Sig Dispense Refill    meloxicam (MOBIC) 15 MG tablet Take 1 tablet by mouth daily as needed for pain 90 tablet 2    metFORMIN (GLUCOPHAGE-XR) 500 MG extended release tablet Take 1 tablet by mouth daily (with breakfast) 90 tablet 1    lisinopril-hydrochlorothiazide (PRINZIDE;ZESTORETIC) 20-25 MG per tablet TAKE ONE TABLET BY MOUTH DAILY (Patient taking differently: nightly TAKE ONE TABLET BY MOUTH DAILY) 90 tablet 3    Probiotic Product (PROBIOTIC DAILY PO) Take by mouth daily       aspirin 81 MG EC tablet Take 81 mg by mouth daily.  Flaxseed, Linseed, 1000 MG CAPS Take 1 tablet by mouth.  Multiple Vitamins-Minerals (THERAPEUTIC MULTIVITAMIN-MINERALS) tablet Take 1 tablet by mouth daily.  blood glucose test strips (TRUE METRIX BLOOD GLUCOSE TEST) strip 1 each by Miscell. (Med.Supl.;Non-Drugs) route daily 100 each 3     No current facility-administered medications on file prior to encounter. General health:  Fairly good. No fever or chills. Skin:  No itching, redness or rash. HEENT:  No headache, epistaxis or sore throat. Neck:  No pain, stiffness or masses. Cardiovascular/Respiratory system:  No chest pain, palpitation or shortness of breath. Gastrointestinal tract: See HPI. Genitourinary:  No burning on micturition. No hesitancy, urgency, frequency or discoloration of urine. Locomotor:  No bone or joint pains. No swelling. Neuropsychiatric:  No referable complaints. GENERAL PHYSICAL EXAM:     Vitals: /64   Pulse 94   Temp 98 °F (36.7 °C)   Ht 5' 2\" (1.575 m)   Wt 155 lb (70.3 kg)   SpO2 99%   BMI 28.35 kg/m²  Body mass index is 28.35 kg/m². GENERAL APPEARANCE:   Morenita Paulino is 65 y.o.,  female, moderately obese, nourished, conscious, alert. Does not appear to be distress or pain at this time. SKIN:  Warm, dry, no cyanosis or jaundice. HEAD:  Normocephalic, atraumatic, no swelling or tenderness. EYES:  Pupils equal, reactive to light. EARS:  No discharge, no marked hearing loss. NOSE:  No rhinorrhea, epistaxis or septal deformity. THROAT:  Not congested. No ulceration bleeding or discharge. NECK:  No stiffness, trachea central.                  CHEST:  Symmetrical and equal on expansion. HEART:  RRR S1 > S2. No audible murmurs or gallops. LUNGS:  Equal on expansion, normal breath sounds. No adventitious sounds. ABDOMEN:  Obese. Soft on palpation. No localized tenderness. No guarding or rigidity. No palpable hepatosplenomegaly. LYMPHATICS:  No palpable cervical lymphadenopathy. LOCOMOTOR, BACK AND SPINE:  No tenderness or deformities. EXTREMITIES:  Symmetrical, no pretibial edema. Lilians sign negative or no calf tenderness. No discoloration or ulcerations. NEUROLOGIC:  The patient is conscious, alert, oriented,Cranial nerve II-XII intact, taste and smell were not examined. No apparent focal sensory or motor deficits.                                                                                      PROVISIONAL DIAGNOSES / SURGERY:      CHOLELITHIASIS    CHOLECYSTECTOMY LAPAROSCOPIC ROBOTIC XI    Patient Active Problem List    Diagnosis Date Noted    History of colonic polyps 07/13/2020    Family history of melanoma 07/13/2020    Osteopenia of multiple sites 11/21/2019    Actinic keratoses 05/14/2018    Type 2 diabetes mellitus without complication, without long-term current use of insulin ([facility] [address] 02/26/2018    Problems with hearing 11/17/2016    Allergic rhinitis 11/08/2015    Cherry angioma 11/08/2015    Fatigue 11/08/2015    Lentigines 11/08/2015    Hypertension 11/08/2015    Sciatica 11/08/2015    Seborrheic keratoses 11/08/2015           NEVILLE PERKINS, SRUINDER - CNP on 1/21/2021 at 3:28 PM

## 2021-01-22 LAB
EKG ATRIAL RATE: 90 BPM
EKG P AXIS: 79 DEGREES
EKG P-R INTERVAL: 150 MS
EKG Q-T INTERVAL: 356 MS
EKG QRS DURATION: 70 MS
EKG QTC CALCULATION (BAZETT): 435 MS
EKG R AXIS: 79 DEGREES
EKG T AXIS: 68 DEGREES
EKG VENTRICULAR RATE: 90 BPM

## 2021-01-28 ENCOUNTER — HOSPITAL ENCOUNTER (OUTPATIENT)
Dept: LAB | Age: 71
Setting detail: SPECIMEN
Discharge: HOME | End: 2021-01-28
Payer: MEDICARE

## 2021-01-28 DIAGNOSIS — Z01.818 PREOP TESTING: Primary | ICD-10-CM

## 2021-01-28 PROCEDURE — U0003 INFECTIOUS AGENT DETECTION BY NUCLEIC ACID (DNA OR RNA); SEVERE ACUTE RESPIRATORY SYNDROME CORONAVIRUS 2 (SARS-COV-2) (CORONAVIRUS DISEASE [COVID-19]), AMPLIFIED PROBE TECHNIQUE, MAKING USE OF HIGH THROUGHPUT TECHNOLOGIES AS DESCRIBED BY CMS-2020-01-R: HCPCS

## 2021-01-28 PROCEDURE — U0005 INFEC AGEN DETEC AMPLI PROBE: HCPCS

## 2021-01-29 ENCOUNTER — ANESTHESIA EVENT (OUTPATIENT)
Dept: ENDOSCOPY | Age: 71
End: 2021-01-29
Payer: MEDICARE

## 2021-01-29 LAB
SARS-COV-2, RAPID: NORMAL
SARS-COV-2: NORMAL
SARS-COV-2: NOT DETECTED
SOURCE: NORMAL

## 2021-02-01 ENCOUNTER — ANESTHESIA (OUTPATIENT)
Dept: ENDOSCOPY | Age: 71
End: 2021-02-01
Payer: MEDICARE

## 2021-02-01 ENCOUNTER — HOSPITAL ENCOUNTER (OUTPATIENT)
Age: 71
Setting detail: OUTPATIENT SURGERY
Discharge: HOME | End: 2021-02-01
Attending: SURGERY | Admitting: SURGERY
Payer: MEDICARE

## 2021-02-01 ENCOUNTER — ANESTHESIA EVENT (OUTPATIENT)
Dept: OPERATING ROOM | Age: 71
End: 2021-02-01
Payer: MEDICARE

## 2021-02-01 VITALS
DIASTOLIC BLOOD PRESSURE: 69 MMHG | TEMPERATURE: 96.1 F | HEIGHT: 62 IN | WEIGHT: 155 LBS | SYSTOLIC BLOOD PRESSURE: 135 MMHG | OXYGEN SATURATION: 100 % | HEART RATE: 78 BPM | RESPIRATION RATE: 12 BRPM | BODY MASS INDEX: 28.52 KG/M2

## 2021-02-01 VITALS — TEMPERATURE: 96.8 F | SYSTOLIC BLOOD PRESSURE: 101 MMHG | DIASTOLIC BLOOD PRESSURE: 63 MMHG | OXYGEN SATURATION: 99 %

## 2021-02-01 LAB
GLUCOSE BLD-MCNC: 122 MG/DL (ref 65–105)
GLUCOSE BLD-MCNC: 141 MG/DL (ref 65–105)

## 2021-02-01 PROCEDURE — 7100000001 HC PACU RECOVERY - ADDTL 15 MIN: Performed by: SURGERY

## 2021-02-01 PROCEDURE — 3700000000 HC ANESTHESIA ATTENDED CARE: Performed by: SURGERY

## 2021-02-01 PROCEDURE — 7100000031 HC ASPR PHASE II RECOVERY - ADDTL 15 MIN: Performed by: SURGERY

## 2021-02-01 PROCEDURE — 88305 TISSUE EXAM BY PATHOLOGIST: CPT

## 2021-02-01 PROCEDURE — 7100000011 HC PHASE II RECOVERY - ADDTL 15 MIN: Performed by: SURGERY

## 2021-02-01 PROCEDURE — 2709999900 HC NON-CHARGEABLE SUPPLY: Performed by: SURGERY

## 2021-02-01 PROCEDURE — 7100000000 HC PACU RECOVERY - FIRST 15 MIN: Performed by: SURGERY

## 2021-02-01 PROCEDURE — 6360000002 HC RX W HCPCS: Performed by: NURSE ANESTHETIST, CERTIFIED REGISTERED

## 2021-02-01 PROCEDURE — 7100000030 HC ASPR PHASE II RECOVERY - FIRST 15 MIN: Performed by: SURGERY

## 2021-02-01 PROCEDURE — 2580000003 HC RX 258: Performed by: ANESTHESIOLOGY

## 2021-02-01 PROCEDURE — 82947 ASSAY GLUCOSE BLOOD QUANT: CPT

## 2021-02-01 PROCEDURE — 88342 IMHCHEM/IMCYTCHM 1ST ANTB: CPT

## 2021-02-01 PROCEDURE — 3609010300 HC COLONOSCOPY W/BIOPSY SINGLE/MULTIPLE: Performed by: SURGERY

## 2021-02-01 PROCEDURE — 3700000001 HC ADD 15 MINUTES (ANESTHESIA): Performed by: SURGERY

## 2021-02-01 PROCEDURE — 2500000003 HC RX 250 WO HCPCS: Performed by: NURSE ANESTHETIST, CERTIFIED REGISTERED

## 2021-02-01 PROCEDURE — 7100000010 HC PHASE II RECOVERY - FIRST 15 MIN: Performed by: SURGERY

## 2021-02-01 RX ORDER — LIDOCAINE HYDROCHLORIDE 10 MG/ML
1 INJECTION, SOLUTION EPIDURAL; INFILTRATION; INTRACAUDAL; PERINEURAL
Status: DISCONTINUED | OUTPATIENT
Start: 2021-02-01 | End: 2021-02-01 | Stop reason: HOSPADM

## 2021-02-01 RX ORDER — LIDOCAINE HYDROCHLORIDE 20 MG/ML
INJECTION, SOLUTION EPIDURAL; INFILTRATION; INTRACAUDAL; PERINEURAL PRN
Status: DISCONTINUED | OUTPATIENT
Start: 2021-02-01 | End: 2021-02-01 | Stop reason: SDUPTHER

## 2021-02-01 RX ORDER — SODIUM CHLORIDE 0.9 % (FLUSH) 0.9 %
10 SYRINGE (ML) INJECTION EVERY 12 HOURS SCHEDULED
Status: DISCONTINUED | OUTPATIENT
Start: 2021-02-01 | End: 2021-02-01 | Stop reason: HOSPADM

## 2021-02-01 RX ORDER — PROPOFOL 10 MG/ML
INJECTION, EMULSION INTRAVENOUS PRN
Status: DISCONTINUED | OUTPATIENT
Start: 2021-02-01 | End: 2021-02-01 | Stop reason: SDUPTHER

## 2021-02-01 RX ORDER — SODIUM CHLORIDE, SODIUM LACTATE, POTASSIUM CHLORIDE, CALCIUM CHLORIDE 600; 310; 30; 20 MG/100ML; MG/100ML; MG/100ML; MG/100ML
INJECTION, SOLUTION INTRAVENOUS CONTINUOUS
Status: DISCONTINUED | OUTPATIENT
Start: 2021-02-01 | End: 2021-02-01 | Stop reason: HOSPADM

## 2021-02-01 RX ORDER — SODIUM CHLORIDE 0.9 % (FLUSH) 0.9 %
10 SYRINGE (ML) INJECTION PRN
Status: DISCONTINUED | OUTPATIENT
Start: 2021-02-01 | End: 2021-02-01 | Stop reason: HOSPADM

## 2021-02-01 RX ADMIN — SODIUM CHLORIDE, POTASSIUM CHLORIDE, SODIUM LACTATE AND CALCIUM CHLORIDE: 600; 310; 30; 20 INJECTION, SOLUTION INTRAVENOUS at 07:54

## 2021-02-01 RX ADMIN — PROPOFOL 50 MG: 10 INJECTION, EMULSION INTRAVENOUS at 08:43

## 2021-02-01 RX ADMIN — LIDOCAINE HYDROCHLORIDE 80 MG: 20 INJECTION, SOLUTION EPIDURAL; INFILTRATION; INTRACAUDAL; PERINEURAL at 08:42

## 2021-02-01 RX ADMIN — PROPOFOL 150 MG: 10 INJECTION, EMULSION INTRAVENOUS at 08:42

## 2021-02-01 ASSESSMENT — PULMONARY FUNCTION TESTS
PIF_VALUE: 13
PIF_VALUE: 15
PIF_VALUE: 14
PIF_VALUE: 0
PIF_VALUE: 14
PIF_VALUE: 15
PIF_VALUE: 15
PIF_VALUE: 4
PIF_VALUE: 13
PIF_VALUE: 2
PIF_VALUE: 21
PIF_VALUE: 14
PIF_VALUE: 13
PIF_VALUE: 14
PIF_VALUE: 0
PIF_VALUE: 15
PIF_VALUE: 14
PIF_VALUE: 15
PIF_VALUE: 14
PIF_VALUE: 13
PIF_VALUE: 20

## 2021-02-01 ASSESSMENT — PAIN SCALES - GENERAL: PAINLEVEL_OUTOF10: 0

## 2021-02-01 ASSESSMENT — ENCOUNTER SYMPTOMS: STRIDOR: 0

## 2021-02-01 ASSESSMENT — PAIN - FUNCTIONAL ASSESSMENT: PAIN_FUNCTIONAL_ASSESSMENT: 0-10

## 2021-02-01 NOTE — ANESTHESIA POSTPROCEDURE EVALUATION
POST- ANESTHESIA EVALUATION       Pt Name: Mor Carvalho  MRN: [id number]  YOB: 1950  Date of evaluation: 2/1/2021  Time:  10:08 AM      /75   Pulse 78   Temp 98.4 °F (36.9 °C)   Resp 11   Ht 5' 2\" (1.575 m)   Wt 155 lb (70.3 kg)   SpO2 100%   BMI 28.35 kg/m²      Consciousness Level  Awake  Cardiopulmonary Status  Stable  Pain Adequately Treated YES  Nausea / Vomiting  NO  Adequate Hydration  YES  Anesthesia Related Complications NONE      Electronically signed by Cheyanne Zaidi MD on 2/1/2021 at 10:08 AM       Department of Anesthesiology  Postprocedure Note    Patient: Mor Cavralho  MRN: [id number]  YOB: 1950  Date of evaluation: 2/1/2021  Time:  10:08 AM     Procedure Summary     Date: 02/01/21 Room / Location: [address] [place] [address] 04 / 250 [facility] ENDO    Anesthesia Start: 7170 Anesthesia Stop: 1020    Procedure: COLONOSCOPY WITH BIOPSY (N/A ) Diagnosis: (DIARRHEA, CHANGE IN BOWEL HABITS)    Surgeons: Ronni Rhodes MD Responsible Provider: Cheyanne Zaidi MD    Anesthesia Type: general ASA Status: 2          Anesthesia Type: general    Sujey Phase I: Sujey Score: 10    Sujey Phase II:      Last vitals: Reviewed and per EMR flowsheets.        Anesthesia Post Evaluation

## 2021-02-01 NOTE — ANESTHESIA PRE PROCEDURE
Department of Anesthesiology  Preprocedure Note       Name:  Karon Torres   Age:  65 y.o.  :  1950                                          MRN:  [id number]         Date:  2021      Surgeon: Shankar Jordan):  Cristóbal Ayala MD    Procedure: Procedure(s):  COLONOSCOPY DIAGNOSTIC    Medications prior to admission:   Prior to Admission medications    Medication Sig Start Date End Date Taking? Authorizing Provider   COVID-19 mRNA Virus Vaccine (COVID-19 MRNA VACCINE, PFIZER, IM) Inject into the muscle   Yes Historical Provider, MD   metFORMIN (GLUCOPHAGE-XR) 500 MG extended release tablet Take 1 tablet by mouth daily (with breakfast) 20  Yes Tisha Maldonado MD   Probiotic Product (PROBIOTIC DAILY PO) Take by mouth daily    Yes Historical Provider, MD   meloxicam (MOBIC) 15 MG tablet Take 1 tablet by mouth daily as needed for pain 20   SURINDER Goodwin - CNP   blood glucose test strips (TRUE METRIX BLOOD GLUCOSE TEST) strip 1 each by Miscell. (Med.Supl.;Non-Drugs) route daily 3/2/20   Tisha Maldonado MD   lisinopril-hydrochlorothiazide (PRINZIDE;ZESTORETIC) 20-25 MG per tablet TAKE ONE TABLET BY MOUTH DAILY  Patient taking differently: nightly TAKE ONE TABLET BY MOUTH DAILY 19   Tisha Maldonado MD   aspirin 81 MG EC tablet Take 81 mg by mouth daily. Historical Provider, MD   Flaxseed, Linseed, 1000 MG CAPS Take 1 tablet by mouth. Historical Provider, MD   Multiple Vitamins-Minerals (THERAPEUTIC MULTIVITAMIN-MINERALS) tablet Take 1 tablet by mouth daily.     Historical Provider, MD       Current medications:    Current Facility-Administered Medications   Medication Dose Route Frequency Provider Last Rate Last Admin    lactated ringers infusion   Intravenous Continuous Kendall Beckman MD        sodium chloride flush 0.9 % injection 10 mL  10 mL Intravenous 2 times per day Kendall Beckman MD  sodium chloride flush 0.9 % injection 10 mL  10 mL Intravenous PRN Svitlana Hopper MD        lidocaine PF 1 % injection 1 mL  1 mL Intradermal Once PRN Svitlana Hopper MD           Allergies:  No Known Allergies    Problem List:    Patient Active Problem List   Diagnosis Code    Allergic rhinitis J30.9    Cherry angioma D18.01    Fatigue R53.83    Lentigines L81.4    Hypertension I10    Sciatica M54.30    Seborrheic keratoses L82.1    Problems with hearing H91.90    Type 2 diabetes mellitus without complication, without long-term current use of insulin (HCC) E11.9    Actinic keratoses L57.0    Osteopenia of multiple sites M85.89    History of colonic polyps Z86.010    Family history of melanoma Z80.8       Past Medical History:        Diagnosis Date    Colon polyp     Diabetes mellitus ([facility] [address]     Diarrhea     Gallstones     H/O neoplasm of uncertain behavior of skin     Hearing aid worn     B/l    Hearing loss, bilateral     Hyperglycemia 11/08/2015    Hypertension     Stye     right eye    Wears glasses        Past Surgical History:        Procedure Laterality Date    BREAST BIOPSY Right     CARDIAC CATHETERIZATION  05/07/2014    COLONOSCOPY      DILATION AND CURETTAGE OF UTERUS      SKIN BIOPSY      TONSILLECTOMY      WISDOM TOOTH EXTRACTION         Social History:    Social History     Tobacco Use    Smoking status: Never Smoker    Smokeless tobacco: Never Used   Substance Use Topics    Alcohol use: Yes     Frequency: 2-4 times a month     Comment: rare                                Counseling given: Not Answered      Vital Signs (Current):   Vitals:    02/01/21 0727   BP: 135/83   Pulse: 88   Resp: 16   Temp: 97.1 °F (36.2 °C)   TempSrc: Infrared   SpO2: 100%   Weight: 155 lb (70.3 kg)   Height: 5' 2\" (1.575 m)                                              BP Readings from Last 3 Encounters:   02/01/21 135/83   01/21/21 131/64   12/23/20 118/66       NPO Status: (-) murmur, weak pulses,  friction rub, systolic click, carotid bruit,  JVD and peripheral edema    ECG reviewed  Rhythm: regular  Rate: normal                    Neuro/Psych:   (+) neuromuscular disease:,             GI/Hepatic/Renal: Neg GI/Hepatic/Renal ROS            Endo/Other:    (+) DiabetesType II DM, , .                 Abdominal:           Vascular: negative vascular ROS. Anesthesia Plan      general     ASA 2       Induction: intravenous. Anesthetic plan and risks discussed with patient. Plan discussed with CRNA.                   Cheyanne Zaidi MD   2/1/2021

## 2021-02-01 NOTE — INTERVAL H&P NOTE
Update History & Physical     The patient's History and Physical of 1-21-21 was reviewed with the patient. I personally examined the patient, I concur with above findings, there was no change. She did have the COVID-19 vaccine on 1-29-21. Today, she is here for a diagnostic colonoscopy- plan is for cholecystectomy tomorrow. .      NPO status: Patient states they have been NPO since before midnight. Medications last taken: YESTERDAY- did not take anti-HTN last night- felt sick from prep, then vomited x1 (multiple time at one time). She did try to complete as much prep as she could, very small amount left of prep. Anticoagulation status: Patient denies taking any anti-coagulants, including aspirin currently. Denies CP, palpitations, dizziness, SOB, URI s/s. Review current vital signs per RN flow sheet. (Notation: Medications listed are not currently reconciled at the signing of this H&P note, to be reconciled in pre-op per RN)    Most recent lab work reviewed:  Lab Results   Component Value Date     01/21/2021    K 4.2 01/21/2021    CL 99 01/21/2021    CO2 29 01/21/2021    BUN 29 (H) 01/21/2021    CREATININE 0.95 (H) 01/21/2021    GLUCOSE 165 (H) 01/21/2021    CALCIUM 9.9 01/21/2021    PROT 7.7 01/21/2021    LABALBU 4.5 01/21/2021    BILITOT 0.41 01/21/2021    ALKPHOS 87 01/21/2021    AST 25 01/21/2021    ALT 25 01/21/2021    LABGLOM 58 (L) 01/21/2021    GFRAA >60 01/21/2021       Lab Results   Component Value Date    WBC 6.0 01/21/2021    HGB 13.5 01/21/2021    HCT 39.0 01/21/2021    MCV 90.4 01/21/2021     01/21/2021       Surgical site was confirmed per myself and the patient.   Electronically signed by SURINDER Moran CNP on 2/1/2021 at 7:05 AM

## 2021-02-01 NOTE — OP NOTE
Operative Note      Patient: Chemo Phelps  YOB: 1950  MRN: [id number]    Date of Procedure: 2/1/2021    PROCEDURE NOTE    DATE OF PROCEDURE: 2/1/2021    SURGEON: Mona Petit MD    ASSISTANT: None    PREOPERATIVE DIAGNOSIS: Change in bowel habits. POSTOPERATIVE DIAGNOSIS: Sigmoid diverticulosis. No evidence of colitis or ileitis. OPERATION: Total colonoscopy to cecum with intubation of terminal ileum and random colon biopsies    ANESTHESIA: General    ESTIMATED BLOOD LOSS: None    COMPLICATIONS: None     SPECIMENS:  Was Obtained: Random colon biopsies    HISTORY: The patient is a 65y.o. year old female with history of above preop diagnosis. I recommended colonoscopy with possible biopsy or polypectomy and I explained the risk, benefits, expected outcome, and alternatives to the procedure. Risks included but are not limited to bleeding, infection, respiratory distress, hypotension, and perforation of the colon and possibility of missing a lesion. The patient understands and is in agreement. PROCEDURE: The patient was given IV conscious sedation. The patient's SPO2 remained above 90% throughout the procedure. Digital rectal exam was normal.  The colonoscope was inserted through the anus into the rectum and advanced under direct vision to the cecum without difficulty. Terminal ileum was examined for approximately 2 inches. The prep was good. Findings:  Terminal ileum: normal    Cecum/Ascending colon: normal    Transverse colon: normal    Descending/Sigmoid colon: abnormal: Sigmoid diverticulosis mild    Rectum/Anus: examined in normal and retroflexed positions and was normal    Withdrawal Time was (minutes): 18      Next screening colonoscopy: 5 years. The colon was decompressed. While withdrawing the scope the above findings were verified and the scope was removed. The patient tolerated the procedure and conscious sedation without unusual events. In the recovery room patient was examined and remains hemodynamically stable. Discharge home when criteria met. Recommendations/Plan:   1. F/U Biopsies  2. F/U In Office as instructed  3. Discussed with the family  4. High fiber diet   5. Precautions to avoid constipation  6. To proceed with robotic cholecystectomy tomorrow as scheduled.     Electronically signed by Ruben Borges MD  on 2/1/2021 at 9:37 AM

## 2021-02-02 ENCOUNTER — HOSPITAL ENCOUNTER (OUTPATIENT)
Age: 71
Setting detail: OUTPATIENT SURGERY
Discharge: HOME | End: 2021-02-02
Attending: SURGERY | Admitting: SURGERY
Payer: MEDICARE

## 2021-02-02 ENCOUNTER — ANESTHESIA (OUTPATIENT)
Dept: OPERATING ROOM | Age: 71
End: 2021-02-02
Payer: MEDICARE

## 2021-02-02 VITALS — OXYGEN SATURATION: 83 % | TEMPERATURE: 96.1 F | SYSTOLIC BLOOD PRESSURE: 120 MMHG | DIASTOLIC BLOOD PRESSURE: 70 MMHG

## 2021-02-02 VITALS
TEMPERATURE: 97.1 F | RESPIRATION RATE: 14 BRPM | HEART RATE: 86 BPM | SYSTOLIC BLOOD PRESSURE: 127 MMHG | WEIGHT: 155 LBS | DIASTOLIC BLOOD PRESSURE: 67 MMHG | OXYGEN SATURATION: 94 % | BODY MASS INDEX: 28.52 KG/M2 | HEIGHT: 62 IN

## 2021-02-02 DIAGNOSIS — K81.1 CHRONIC CHOLECYSTITIS: Primary | ICD-10-CM

## 2021-02-02 LAB
GLUCOSE BLD-MCNC: 122 MG/DL (ref 65–105)
GLUCOSE BLD-MCNC: 143 MG/DL (ref 65–105)
SURGICAL PATHOLOGY REPORT: NORMAL

## 2021-02-02 PROCEDURE — 3700000001 HC ADD 15 MINUTES (ANESTHESIA): Performed by: SURGERY

## 2021-02-02 PROCEDURE — 99999 PR OFFICE/OUTPT VISIT,PROCEDURE ONLY: CPT | Performed by: PHYSICIAN ASSISTANT

## 2021-02-02 PROCEDURE — 2580000003 HC RX 258: Performed by: ANESTHESIOLOGY

## 2021-02-02 PROCEDURE — 7100000031 HC ASPR PHASE II RECOVERY - ADDTL 15 MIN: Performed by: SURGERY

## 2021-02-02 PROCEDURE — 2500000003 HC RX 250 WO HCPCS: Performed by: SURGERY

## 2021-02-02 PROCEDURE — 88304 TISSUE EXAM BY PATHOLOGIST: CPT

## 2021-02-02 PROCEDURE — 2580000003 HC RX 258: Performed by: NURSE ANESTHETIST, CERTIFIED REGISTERED

## 2021-02-02 PROCEDURE — 3600000019 HC SURGERY ROBOT ADDTL 15MIN: Performed by: SURGERY

## 2021-02-02 PROCEDURE — S2900 ROBOTIC SURGICAL SYSTEM: HCPCS | Performed by: SURGERY

## 2021-02-02 PROCEDURE — 6360000002 HC RX W HCPCS: Performed by: NURSE ANESTHETIST, CERTIFIED REGISTERED

## 2021-02-02 PROCEDURE — 7100000000 HC PACU RECOVERY - FIRST 15 MIN: Performed by: SURGERY

## 2021-02-02 PROCEDURE — 82947 ASSAY GLUCOSE BLOOD QUANT: CPT

## 2021-02-02 PROCEDURE — 7100000010 HC PHASE II RECOVERY - FIRST 15 MIN: Performed by: SURGERY

## 2021-02-02 PROCEDURE — 3600000009 HC SURGERY ROBOT BASE: Performed by: SURGERY

## 2021-02-02 PROCEDURE — 6370000000 HC RX 637 (ALT 250 FOR IP): Performed by: ANESTHESIOLOGY

## 2021-02-02 PROCEDURE — 2500000003 HC RX 250 WO HCPCS: Performed by: NURSE ANESTHETIST, CERTIFIED REGISTERED

## 2021-02-02 PROCEDURE — 7100000030 HC ASPR PHASE II RECOVERY - FIRST 15 MIN: Performed by: SURGERY

## 2021-02-02 PROCEDURE — 2709999900 HC NON-CHARGEABLE SUPPLY: Performed by: SURGERY

## 2021-02-02 PROCEDURE — 7100000001 HC PACU RECOVERY - ADDTL 15 MIN: Performed by: SURGERY

## 2021-02-02 PROCEDURE — 7100000011 HC PHASE II RECOVERY - ADDTL 15 MIN: Performed by: SURGERY

## 2021-02-02 PROCEDURE — 3700000000 HC ANESTHESIA ATTENDED CARE: Performed by: SURGERY

## 2021-02-02 RX ORDER — BUPIVACAINE HYDROCHLORIDE 5 MG/ML
INJECTION, SOLUTION EPIDURAL; INTRACAUDAL PRN
Status: DISCONTINUED | OUTPATIENT
Start: 2021-02-02 | End: 2021-02-02 | Stop reason: ALTCHOICE

## 2021-02-02 RX ORDER — OXYCODONE HYDROCHLORIDE AND ACETAMINOPHEN 5; 325 MG/1; MG/1
1 TABLET ORAL EVERY 6 HOURS PRN
Qty: 28 TABLET | Refills: 0 | Status: SHIPPED | OUTPATIENT
Start: 2021-02-02 | End: 2021-02-09

## 2021-02-02 RX ORDER — SODIUM CHLORIDE, SODIUM LACTATE, POTASSIUM CHLORIDE, CALCIUM CHLORIDE 600; 310; 30; 20 MG/100ML; MG/100ML; MG/100ML; MG/100ML
INJECTION, SOLUTION INTRAVENOUS CONTINUOUS PRN
Status: DISCONTINUED | OUTPATIENT
Start: 2021-02-02 | End: 2021-02-02 | Stop reason: SDUPTHER

## 2021-02-02 RX ORDER — SODIUM CHLORIDE 9 MG/ML
INJECTION, SOLUTION INTRAVENOUS CONTINUOUS
Status: DISCONTINUED | OUTPATIENT
Start: 2021-02-02 | End: 2021-02-02 | Stop reason: HOSPADM

## 2021-02-02 RX ORDER — FENTANYL CITRATE 50 UG/ML
INJECTION, SOLUTION INTRAMUSCULAR; INTRAVENOUS PRN
Status: DISCONTINUED | OUTPATIENT
Start: 2021-02-02 | End: 2021-02-02 | Stop reason: SDUPTHER

## 2021-02-02 RX ORDER — GLYCOPYRROLATE 1 MG/5 ML
SYRINGE (ML) INTRAVENOUS PRN
Status: DISCONTINUED | OUTPATIENT
Start: 2021-02-02 | End: 2021-02-02 | Stop reason: SDUPTHER

## 2021-02-02 RX ORDER — DIPHENHYDRAMINE HYDROCHLORIDE 50 MG/ML
12.5 INJECTION INTRAMUSCULAR; INTRAVENOUS
Status: DISCONTINUED | OUTPATIENT
Start: 2021-02-02 | End: 2021-02-02 | Stop reason: HOSPADM

## 2021-02-02 RX ORDER — LIDOCAINE HYDROCHLORIDE 10 MG/ML
INJECTION, SOLUTION EPIDURAL; INFILTRATION; INTRACAUDAL; PERINEURAL PRN
Status: DISCONTINUED | OUTPATIENT
Start: 2021-02-02 | End: 2021-02-02 | Stop reason: SDUPTHER

## 2021-02-02 RX ORDER — EPHEDRINE SULFATE 50 MG/ML
INJECTION, SOLUTION INTRAVENOUS PRN
Status: DISCONTINUED | OUTPATIENT
Start: 2021-02-02 | End: 2021-02-02 | Stop reason: SDUPTHER

## 2021-02-02 RX ORDER — ONDANSETRON 2 MG/ML
INJECTION INTRAMUSCULAR; INTRAVENOUS PRN
Status: DISCONTINUED | OUTPATIENT
Start: 2021-02-02 | End: 2021-02-02 | Stop reason: SDUPTHER

## 2021-02-02 RX ORDER — ONDANSETRON 4 MG/1
TABLET, FILM COATED ORAL
Qty: 20 TABLET | Refills: 0 | Status: SHIPPED | OUTPATIENT
Start: 2021-02-02 | End: 2022-01-06

## 2021-02-02 RX ORDER — SODIUM CHLORIDE 0.9 % (FLUSH) 0.9 %
10 SYRINGE (ML) INJECTION EVERY 12 HOURS SCHEDULED
Status: DISCONTINUED | OUTPATIENT
Start: 2021-02-02 | End: 2021-02-02 | Stop reason: HOSPADM

## 2021-02-02 RX ORDER — LABETALOL HYDROCHLORIDE 5 MG/ML
5 INJECTION, SOLUTION INTRAVENOUS EVERY 10 MIN PRN
Status: DISCONTINUED | OUTPATIENT
Start: 2021-02-02 | End: 2021-02-02 | Stop reason: HOSPADM

## 2021-02-02 RX ORDER — CEPHALEXIN 500 MG/1
CAPSULE ORAL
Qty: 21 CAPSULE | Refills: 0 | Status: SHIPPED | OUTPATIENT
Start: 2021-02-02 | End: 2022-01-06

## 2021-02-02 RX ORDER — LIDOCAINE HYDROCHLORIDE 10 MG/ML
1 INJECTION, SOLUTION EPIDURAL; INFILTRATION; INTRACAUDAL; PERINEURAL
Status: DISCONTINUED | OUTPATIENT
Start: 2021-02-02 | End: 2021-02-02 | Stop reason: HOSPADM

## 2021-02-02 RX ORDER — ROCURONIUM BROMIDE 10 MG/ML
INJECTION, SOLUTION INTRAVENOUS PRN
Status: DISCONTINUED | OUTPATIENT
Start: 2021-02-02 | End: 2021-02-02 | Stop reason: SDUPTHER

## 2021-02-02 RX ORDER — MIDAZOLAM HYDROCHLORIDE 1 MG/ML
INJECTION INTRAMUSCULAR; INTRAVENOUS PRN
Status: DISCONTINUED | OUTPATIENT
Start: 2021-02-02 | End: 2021-02-02 | Stop reason: SDUPTHER

## 2021-02-02 RX ORDER — PROPOFOL 10 MG/ML
INJECTION, EMULSION INTRAVENOUS PRN
Status: DISCONTINUED | OUTPATIENT
Start: 2021-02-02 | End: 2021-02-02 | Stop reason: SDUPTHER

## 2021-02-02 RX ORDER — OXYCODONE HYDROCHLORIDE AND ACETAMINOPHEN 5; 325 MG/1; MG/1
2 TABLET ORAL PRN
Status: COMPLETED | OUTPATIENT
Start: 2021-02-02 | End: 2021-02-02

## 2021-02-02 RX ORDER — ONDANSETRON 2 MG/ML
4 INJECTION INTRAMUSCULAR; INTRAVENOUS
Status: DISCONTINUED | OUTPATIENT
Start: 2021-02-02 | End: 2021-02-02 | Stop reason: HOSPADM

## 2021-02-02 RX ORDER — SODIUM CHLORIDE 0.9 % (FLUSH) 0.9 %
10 SYRINGE (ML) INJECTION PRN
Status: DISCONTINUED | OUTPATIENT
Start: 2021-02-02 | End: 2021-02-02 | Stop reason: HOSPADM

## 2021-02-02 RX ORDER — OXYCODONE HYDROCHLORIDE AND ACETAMINOPHEN 5; 325 MG/1; MG/1
1 TABLET ORAL PRN
Status: COMPLETED | OUTPATIENT
Start: 2021-02-02 | End: 2021-02-02

## 2021-02-02 RX ORDER — CEFAZOLIN SODIUM 1 G/3ML
INJECTION, POWDER, FOR SOLUTION INTRAMUSCULAR; INTRAVENOUS PRN
Status: DISCONTINUED | OUTPATIENT
Start: 2021-02-02 | End: 2021-02-02 | Stop reason: SDUPTHER

## 2021-02-02 RX ORDER — DEXAMETHASONE SODIUM PHOSPHATE 4 MG/ML
INJECTION, SOLUTION INTRA-ARTICULAR; INTRALESIONAL; INTRAMUSCULAR; INTRAVENOUS; SOFT TISSUE PRN
Status: DISCONTINUED | OUTPATIENT
Start: 2021-02-02 | End: 2021-02-02 | Stop reason: SDUPTHER

## 2021-02-02 RX ADMIN — Medication 0.2 MG: at 14:54

## 2021-02-02 RX ADMIN — PROPOFOL 150 MG: 10 INJECTION, EMULSION INTRAVENOUS at 14:28

## 2021-02-02 RX ADMIN — OXYCODONE HYDROCHLORIDE AND ACETAMINOPHEN 1 TABLET: 5; 325 TABLET ORAL at 16:58

## 2021-02-02 RX ADMIN — SODIUM CHLORIDE, POTASSIUM CHLORIDE, SODIUM LACTATE AND CALCIUM CHLORIDE: 600; 310; 30; 20 INJECTION, SOLUTION INTRAVENOUS at 15:22

## 2021-02-02 RX ADMIN — FENTANYL CITRATE 50 MCG: 50 INJECTION, SOLUTION INTRAMUSCULAR; INTRAVENOUS at 14:28

## 2021-02-02 RX ADMIN — ONDANSETRON 4 MG: 2 INJECTION INTRAMUSCULAR; INTRAVENOUS at 14:50

## 2021-02-02 RX ADMIN — FENTANYL CITRATE 50 MCG: 50 INJECTION, SOLUTION INTRAMUSCULAR; INTRAVENOUS at 15:06

## 2021-02-02 RX ADMIN — EPHEDRINE SULFATE 20 MG: 50 INJECTION INTRAMUSCULAR; INTRAVENOUS; SUBCUTANEOUS at 15:01

## 2021-02-02 RX ADMIN — PHENYLEPHRINE HYDROCHLORIDE 100 MCG: 10 INJECTION INTRAVENOUS at 15:04

## 2021-02-02 RX ADMIN — LIDOCAINE HYDROCHLORIDE 50 MG: 10 INJECTION, SOLUTION EPIDURAL; INFILTRATION; INTRACAUDAL; PERINEURAL at 14:28

## 2021-02-02 RX ADMIN — ROCURONIUM BROMIDE 20 MG: 10 INJECTION, SOLUTION INTRAVENOUS at 14:48

## 2021-02-02 RX ADMIN — SODIUM CHLORIDE: 9 INJECTION, SOLUTION INTRAVENOUS at 13:44

## 2021-02-02 RX ADMIN — DEXAMETHASONE SODIUM PHOSPHATE 4 MG: 4 INJECTION, SOLUTION INTRAMUSCULAR; INTRAVENOUS at 14:36

## 2021-02-02 RX ADMIN — CEFAZOLIN 2000 MG: 1 INJECTION, POWDER, FOR SOLUTION INTRAMUSCULAR; INTRAVENOUS at 14:34

## 2021-02-02 RX ADMIN — MIDAZOLAM 2 MG: 1 INJECTION INTRAMUSCULAR; INTRAVENOUS at 14:21

## 2021-02-02 RX ADMIN — ROCURONIUM BROMIDE 30 MG: 10 INJECTION, SOLUTION INTRAVENOUS at 14:28

## 2021-02-02 ASSESSMENT — PULMONARY FUNCTION TESTS
PIF_VALUE: 16
PIF_VALUE: 16
PIF_VALUE: 14
PIF_VALUE: 19
PIF_VALUE: 19
PIF_VALUE: 13
PIF_VALUE: 15
PIF_VALUE: 19
PIF_VALUE: 16
PIF_VALUE: 15
PIF_VALUE: 14
PIF_VALUE: 2
PIF_VALUE: 16
PIF_VALUE: 19
PIF_VALUE: 19
PIF_VALUE: 17
PIF_VALUE: 16
PIF_VALUE: 15
PIF_VALUE: 24
PIF_VALUE: 16
PIF_VALUE: 2
PIF_VALUE: 16
PIF_VALUE: 20
PIF_VALUE: 1
PIF_VALUE: 0
PIF_VALUE: 17
PIF_VALUE: 20
PIF_VALUE: 16
PIF_VALUE: 15
PIF_VALUE: 14
PIF_VALUE: 16
PIF_VALUE: 16
PIF_VALUE: 1
PIF_VALUE: 1
PIF_VALUE: 24
PIF_VALUE: 19
PIF_VALUE: 19
PIF_VALUE: 17
PIF_VALUE: 14
PIF_VALUE: 28
PIF_VALUE: 15
PIF_VALUE: 20
PIF_VALUE: 10

## 2021-02-02 ASSESSMENT — PAIN SCALES - GENERAL: PAINLEVEL_OUTOF10: 5

## 2021-02-02 ASSESSMENT — ENCOUNTER SYMPTOMS: STRIDOR: 0

## 2021-02-02 ASSESSMENT — PAIN DESCRIPTION - PAIN TYPE: TYPE: SURGICAL PAIN

## 2021-02-02 NOTE — ANESTHESIA POSTPROCEDURE EVALUATION
Department of Anesthesiology  Postprocedure Note    Patient: Morenita Paulino  MRN: [id number]  YOB: 1950  Date of evaluation: 2/2/2021  Time:  5:01 PM     Procedure Summary     Date: 02/02/21 Room / Location: [address] [place] [address] Diana Bosch 10 / [facility]: DEBRA HERNANDEZ    Anesthesia Start: 9745 Anesthesia Stop: 1110    Procedure: CHOLECYSTECTOMY LAPAROSCOPIC ROBOTIC XI (N/A Abdomen) Diagnosis: (CHOLECYSTITIS)    Surgeons: Nancy Betancourt MD Responsible Provider: Shadia Casillas MD    Anesthesia Type: general ASA Status: 3          Anesthesia Type: general    Sujey Phase I: Sujey Score: 10    Sujey Phase II:      Last vitals: Reviewed and per EMR flowsheets.        Anesthesia Post Evaluation    Comments: POST- ANESTHESIA EVALUATION       Pt Name: Morenita Paulino  MRN: [id number]  YOB: 1950  Date of evaluation: 2/2/2021  Time:  5:01 PM      /60   Pulse 88   Temp 98.2 °F (36.8 °C)   Resp 12   Ht 5' 2\" (1.575 m)   Wt 155 lb (70.3 kg)   SpO2 95%   BMI 28.35 kg/m²      Consciousness Level  Awake  Cardiopulmonary Status  Stable  Pain Adequately Treated YES  Nausea / Vomiting  NO  Adequate Hydration  YES  Anesthesia Related Complications NONE      Electronically signed by Shadia Casillas MD on 2/2/2021 at 5:01 PM

## 2021-02-02 NOTE — INTERVAL H&P NOTE
Update History & Physical    The patient's History and Physical of January 21, 2021 was reviewed with the patient and I examined the patient. There was no change. The surgical site was confirmed by the patient and me. Robotic Laparoscopic Cholecystectomy    Pt denies fever/chills, chest pain , SOB, no problem with anesthesia, no hx of MRSA infection   Pt Npo since the past midnight, no am medication     See nursing flow sheet for vital signs     Plan: The risks, benefits, expected outcome, and alternative to the recommended procedure have been discussed with the patient. Patient understands and wants to proceed with the procedure.      Electronically signed by SURINDER Collins CNP on 2/2/2021 at 1:21 PM

## 2021-02-02 NOTE — ANESTHESIA PRE PROCEDURE
Department of Anesthesiology  Preprocedure Note       Name:  Leena Cm   Age:  65 y.o.  :  1950                                          MRN:  [id number]         Date:  2021      Surgeon: Liu Archer):  Annmarie Hernandez MD    Procedure: Procedure(s):  CHOLECYSTECTOMY LAPAROSCOPIC ROBOTIC XI    Medications prior to admission:   Prior to Admission medications    Medication Sig Start Date End Date Taking? Authorizing Provider   COVID-19 mRNA Virus Vaccine (COVID-19 MRNA VACCINE, PFIZER, IM) Inject into the muscle    Historical Provider, MD   meloxicam (MOBIC) 15 MG tablet Take 1 tablet by mouth daily as needed for pain 20   SURINDER Araiza - CNP   metFORMIN (GLUCOPHAGE-XR) 500 MG extended release tablet Take 1 tablet by mouth daily (with breakfast) 20   Jose Antonio Anglin MD   blood glucose test strips (TRUE METRIX BLOOD GLUCOSE TEST) strip 1 each by Miscell. (Med.Supl.;Non-Drugs) route daily 3/2/20   Jose Antonio Anglin MD   lisinopril-hydrochlorothiazide (PRINZIDE;ZESTORETIC) 20-25 MG per tablet TAKE ONE TABLET BY MOUTH DAILY  Patient taking differently: nightly TAKE ONE TABLET BY MOUTH DAILY 19   Jose Antonio Anglin MD   Probiotic Product (PROBIOTIC DAILY PO) Take by mouth daily     Historical Provider, MD   aspirin 81 MG EC tablet Take 81 mg by mouth daily. Historical Provider, MD   Flaxseed, Linseed, 1000 MG CAPS Take 1 tablet by mouth. Historical Provider, MD   Multiple Vitamins-Minerals (THERAPEUTIC MULTIVITAMIN-MINERALS) tablet Take 1 tablet by mouth daily. Historical Provider, MD       Current medications:    No current facility-administered medications for this visit. No current outpatient medications on file.      Facility-Administered Medications Ordered in Other Visits   Medication Dose Route Frequency Provider Last Rate Last Admin    sodium chloride flush 0.9 % injection 10 mL  10 mL Intravenous 2 times per day Harshal Carnes MD        sodium chloride flush 0.9 % injection 10 mL  10 mL Intravenous PRN Anders Leyva MD        lidocaine PF 1 % injection 1 mL  1 mL Intradermal Once PRN Anders Leyva MD        0.9 % sodium chloride infusion   Intravenous Continuous Anders Leyva MD        ceFAZolin (ANCEF) 2000 mg in dextrose 5 % 50 mL IVPB  2,000 mg Intravenous Once Ruben Borges MD           Allergies:  No Known Allergies    Problem List:    Patient Active Problem List   Diagnosis Code    Allergic rhinitis J30.9    Cherry angioma D18.01    Fatigue R53.83    Lentigines L81.4    Hypertension I10    Sciatica M54.30    Seborrheic keratoses L82.1    Problems with hearing H91.90    Type 2 diabetes mellitus without complication, without long-term current use of insulin (HCC) E11.9    Actinic keratoses L57.0    Osteopenia of multiple sites M85.89    History of colonic polyps Z86.010    Family history of melanoma Z80.8       Past Medical History:        Diagnosis Date    Colon polyp     Diabetes mellitus ([facility] [address]     Diarrhea     Gallstones     H/O neoplasm of uncertain behavior of skin     Hearing aid worn     B/l    Hearing loss, bilateral     Hyperglycemia 11/08/2015    Hypertension     Stye     right eye    Wears glasses        Past Surgical History:        Procedure Laterality Date    BREAST BIOPSY Right     CARDIAC CATHETERIZATION  05/07/2014    COLONOSCOPY      COLONOSCOPY N/A 2/1/2021    COLONOSCOPY WITH BIOPSY performed by Ruben Borges MD at [address] [place] [address]      SKIN BIOPSY      TONSILLECTOMY      WISDOM TOOTH EXTRACTION         Social History:    Social History     Tobacco Use    Smoking status: Never Smoker    Smokeless tobacco: Never Used   Substance Use Topics    Alcohol use: Yes     Frequency: 2-4 times a month     Comment: rare                                Counseling given: Not Answered      Vital Signs (Current): There were no vitals filed for this visit. BP Readings from Last 3 Encounters:   02/02/21 122/67   02/01/21 101/63   02/01/21 135/69       NPO Status:                                                                                 BMI:   Wt Readings from Last 3 Encounters:   02/02/21 155 lb (70.3 kg)   02/01/21 155 lb (70.3 kg)   01/21/21 155 lb (70.3 kg)     There is no height or weight on file to calculate BMI.    CBC:   Lab Results   Component Value Date    WBC 6.0 01/21/2021    RBC 4.31 01/21/2021    HGB 13.5 01/21/2021    HCT 39.0 01/21/2021    MCV 90.4 01/21/2021    RDW 12.5 01/21/2021     01/21/2021       CMP:   Lab Results   Component Value Date     01/21/2021    K 4.2 01/21/2021    CL 99 01/21/2021    CO2 29 01/21/2021    BUN 29 01/21/2021    CREATININE 0.95 01/21/2021    GFRAA >60 01/21/2021    LABGLOM 58 01/21/2021    GLUCOSE 165 01/21/2021    PROT 7.7 01/21/2021    CALCIUM 9.9 01/21/2021    BILITOT 0.41 01/21/2021    ALKPHOS 87 01/21/2021    AST 25 01/21/2021    ALT 25 01/21/2021       POC Tests:   Recent Labs     02/01/21  0940   POCGLU 122*       Coags: No results found for: PROTIME, INR, APTT    HCG (If Applicable): No results found for: PREGTESTUR, PREGSERUM, HCG, HCGQUANT     ABGs: No results found for: PHART, PO2ART, HJT0ZAP, UKV0ULH, BEART, G1CKOYBO     Type & Screen (If Applicable):  No results found for: LABABO, LABRH    Drug/Infectious Status (If Applicable):  Lab Results   Component Value Date    HEPCAB NONREACTIVE 12/11/2018       COVID-19 Screening (If Applicable):   Lab Results   Component Value Date    COVID19 Not Detected 01/28/2021         Anesthesia Evaluation  Patient summary reviewed and Nursing notes reviewed no history of anesthetic complications:   Airway: Mallampati: III  TM distance: >3 FB   Neck ROM: full  Mouth opening: > = 3 FB Dental: normal exam         Pulmonary:Negative Pulmonary ROS and normal exam  breath sounds clear to auscultation      (-) rhonchi, wheezes and stridor Cardiovascular:    (+) hypertension: no interval change,     (-) murmur, weak pulses,  friction rub, systolic click, carotid bruit,  JVD and peripheral edema    ECG reviewed  Rhythm: regular  Rate: normal                    Neuro/Psych:   (+) neuromuscular disease:,              ROS comment: Sciatica  [place] [facility]/Hepatic/Renal:   (+) renal disease (Increased BUN/Creatinine):,           Endo/Other:    (+) DiabetesType II DM, , .                 Abdominal:           Vascular: negative vascular ROS. Anesthesia Plan      general     ASA 3       Induction: intravenous. MIPS: Postoperative opioids intended and Prophylactic antiemetics administered. Anesthetic plan and risks discussed with patient. Plan discussed with CRNA.                   Vinh Song MD   2/2/2021

## 2021-02-02 NOTE — OP NOTE
Operative Note      Patient: Rafaela Ziegler  YOB: 1950  MRN: [id number]    Date of Procedure: 2/2/2021    Pre-Op Diagnosis: CHRONIC CHOLECYSTITIS    Post-Op Diagnosis: Same       Procedure(s):  CHOLECYSTECTOMY LAPAROSCOPIC ROBOTIC XI    Surgeon(s):  Evelin Borjas MD    Assistant:   First Assistant: SANYA Knox    Anesthesia: General    Estimated Blood Loss (mL): less than 50     Complications: None    Specimens:   ID Type Source Tests Collected by Time Destination   A : gall bladder Tissue Gallbladder SURGICAL PATHOLOGY Evelin Borjas MD 2/2/2021 1526        Implants:  * No implants in log *      Drains: * No LDAs found *    Findings: Dictated    Detailed Description of Procedure:

## 2021-02-04 LAB — SURGICAL PATHOLOGY REPORT: NORMAL

## 2021-02-21 DIAGNOSIS — I10 ESSENTIAL HYPERTENSION: ICD-10-CM

## 2021-02-22 RX ORDER — LISINOPRIL AND HYDROCHLOROTHIAZIDE 25; 20 MG/1; MG/1
TABLET ORAL
Qty: 90 TABLET | Refills: 3 | Status: SHIPPED | OUTPATIENT
Start: 2021-02-22 | End: 2021-10-01 | Stop reason: SDUPTHER

## 2021-03-25 ENCOUNTER — OFFICE VISIT (OUTPATIENT)
Dept: PRIMARY CARE CLINIC | Age: 71
End: 2021-03-25
Payer: MEDICARE

## 2021-03-25 VITALS
BODY MASS INDEX: 28.13 KG/M2 | TEMPERATURE: 97.2 F | SYSTOLIC BLOOD PRESSURE: 138 MMHG | OXYGEN SATURATION: 98 % | WEIGHT: 153.8 LBS | DIASTOLIC BLOOD PRESSURE: 70 MMHG | HEART RATE: 75 BPM

## 2021-03-25 DIAGNOSIS — I10 ESSENTIAL HYPERTENSION: ICD-10-CM

## 2021-03-25 DIAGNOSIS — E11.9 TYPE 2 DIABETES MELLITUS WITHOUT COMPLICATION, WITHOUT LONG-TERM CURRENT USE OF INSULIN (HCC): Primary | ICD-10-CM

## 2021-03-25 DIAGNOSIS — K52.832 LYMPHOCYTIC COLITIS: ICD-10-CM

## 2021-03-25 LAB — HBA1C MFR BLD: 7.3 %

## 2021-03-25 PROCEDURE — G8427 DOCREV CUR MEDS BY ELIG CLIN: HCPCS | Performed by: FAMILY MEDICINE

## 2021-03-25 PROCEDURE — 1090F PRES/ABSN URINE INCON ASSESS: CPT | Performed by: FAMILY MEDICINE

## 2021-03-25 PROCEDURE — 2022F DILAT RTA XM EVC RTNOPTHY: CPT | Performed by: FAMILY MEDICINE

## 2021-03-25 PROCEDURE — 1036F TOBACCO NON-USER: CPT | Performed by: FAMILY MEDICINE

## 2021-03-25 PROCEDURE — G8417 CALC BMI ABV UP PARAM F/U: HCPCS | Performed by: FAMILY MEDICINE

## 2021-03-25 PROCEDURE — 1123F ACP DISCUSS/DSCN MKR DOCD: CPT | Performed by: FAMILY MEDICINE

## 2021-03-25 PROCEDURE — 3051F HG A1C>EQUAL 7.0%<8.0%: CPT | Performed by: FAMILY MEDICINE

## 2021-03-25 PROCEDURE — 99213 OFFICE O/P EST LOW 20 MIN: CPT | Performed by: FAMILY MEDICINE

## 2021-03-25 PROCEDURE — G8484 FLU IMMUNIZE NO ADMIN: HCPCS | Performed by: FAMILY MEDICINE

## 2021-03-25 PROCEDURE — 4040F PNEUMOC VAC/ADMIN/RCVD: CPT | Performed by: FAMILY MEDICINE

## 2021-03-25 PROCEDURE — 83036 HEMOGLOBIN GLYCOSYLATED A1C: CPT | Performed by: FAMILY MEDICINE

## 2021-03-25 PROCEDURE — 3017F COLORECTAL CA SCREEN DOC REV: CPT | Performed by: FAMILY MEDICINE

## 2021-03-25 PROCEDURE — G8399 PT W/DXA RESULTS DOCUMENT: HCPCS | Performed by: FAMILY MEDICINE

## 2021-03-25 ASSESSMENT — ENCOUNTER SYMPTOMS
NAUSEA: 0
RHINORRHEA: 0
ABDOMINAL PAIN: 0
VOMITING: 0
EYE DISCHARGE: 0
SORE THROAT: 0
WHEEZING: 0
SHORTNESS OF BREATH: 0
EYE REDNESS: 0
DIARRHEA: 1
COUGH: 0

## 2021-03-25 NOTE — PROGRESS NOTES
meloxicam (MOBIC) 15 MG tablet Take 1 tablet by mouth daily as needed for pain 90 tablet 2    metFORMIN (GLUCOPHAGE-XR) 500 MG extended release tablet Take 1 tablet by mouth daily (with breakfast) 90 tablet 1    blood glucose test strips (TRUE METRIX BLOOD GLUCOSE TEST) strip 1 each by Miscell. (Med.Supl.;Non-Drugs) route daily 100 each 3    Probiotic Product (PROBIOTIC DAILY PO) Take by mouth daily       aspirin 81 MG EC tablet Take 81 mg by mouth daily.  Flaxseed, Linseed, 1000 MG CAPS Take 1 tablet by mouth.  Multiple Vitamins-Minerals (THERAPEUTIC MULTIVITAMIN-MINERALS) tablet Take 1 tablet by mouth daily.  cephALEXin (KEFLEX) 500 MG capsule 500 mgTake three times daily (Patient not taking: Reported on 3/25/2021) 21 capsule 0    ondansetron (ZOFRAN) 4 MG tablet Take every six hours as needed (Patient not taking: Reported on 3/25/2021) 20 tablet 0     No current facility-administered medications for this visit. No Known Allergies    Health Maintenance   Topic Date Due    DTaP/Tdap/Td vaccine (1 - Tdap) Never done    Annual Wellness Visit (AWV)  Never done    Diabetic foot exam  06/04/2019    Lipid screen  12/11/2019    Diabetic retinal exam  08/11/2021    Diabetic microalbuminuria test  09/16/2021    Potassium monitoring  01/21/2022    Creatinine monitoring  01/21/2022    Breast cancer screen  03/09/2022    A1C test (Diabetic or Prediabetic)  03/25/2022    Colon cancer screen colonoscopy  02/01/2031    DEXA (modify frequency per FRAX score)  Completed    Flu vaccine  Completed    Shingles Vaccine  Completed    Pneumococcal 65+ years Vaccine  Completed    COVID-19 Vaccine  Completed    Hepatitis C screen  Completed    Hepatitis A vaccine  Aged Out    Hib vaccine  Aged Out    Meningococcal (ACWY) vaccine  Aged Out       Subjective:     Review of Systems   Constitutional: Negative for chills and fever. HENT: Negative for rhinorrhea and sore throat.     Eyes: Negative for discharge and redness. Respiratory: Negative for cough, shortness of breath and wheezing. Cardiovascular: Negative for chest pain and palpitations. Gastrointestinal: Positive for diarrhea. Negative for abdominal pain, nausea and vomiting. Genitourinary: Negative for dysuria and frequency. Musculoskeletal: Negative for arthralgias and myalgias. Neurological: Negative for dizziness, light-headedness and headaches. Psychiatric/Behavioral: Negative for sleep disturbance. Objective:     /70   Pulse 75   Temp 97.2 °F (36.2 °C)   Wt 153 lb 12.8 oz (69.8 kg)   SpO2 98%   BMI 28.13 kg/m²   Physical Exam  Vitals signs and nursing note reviewed. Constitutional:       General: She is not in acute distress. Appearance: She is well-developed. She is not ill-appearing. HENT:      Head: Normocephalic and atraumatic. Right Ear: External ear normal.      Left Ear: External ear normal.   Eyes:      General: No scleral icterus. Right eye: No discharge. Left eye: No discharge. Conjunctiva/sclera: Conjunctivae normal.      Pupils: Pupils are equal, round, and reactive to light. Neck:      Thyroid: No thyromegaly. Trachea: No tracheal deviation. Cardiovascular:      Rate and Rhythm: Normal rate and regular rhythm. Heart sounds: Normal heart sounds. Pulmonary:      Effort: Pulmonary effort is normal. No respiratory distress. Breath sounds: Normal breath sounds. No wheezing. Lymphadenopathy:      Cervical: No cervical adenopathy. Skin:     General: Skin is warm. Findings: No rash. Neurological:      Mental Status: She is alert and oriented to person, place, and time. Psychiatric:         Mood and Affect: Mood normal.         Behavior: Behavior normal.         Thought Content: Thought content normal.         Assessment:       Diagnosis Orders   1.  Type 2 diabetes mellitus without complication, without long-term current use of insulin ([address] POCT glycosylated hemoglobin (Hb A1C)   2. Lymphocytic colitis     3. Essential hypertension          Plan:      Return in about 3 months (around 6/25/2021) for DM check, HTN f/u. Orders Placed This Encounter   Procedures    POCT glycosylated hemoglobin (Hb A1C)     No orders of the defined types were placed in this encounter. Patient given educational materials - see patient instructions. Discussed use, benefit, and side effects of prescribed medications. All patient questions answered. Pt voiced understanding. Reviewed health maintenance. Instructed to continue current medications, diet and exercise. Patient agreed with treatment plan. Follow up as directed.      Electronicallysigned by Brittney Rosales MD on 3/25/2021 at 1:42 PM

## 2021-06-30 ENCOUNTER — OFFICE VISIT (OUTPATIENT)
Dept: PRIMARY CARE CLINIC | Age: 71
End: 2021-06-30
Payer: MEDICARE

## 2021-06-30 VITALS
DIASTOLIC BLOOD PRESSURE: 68 MMHG | BODY MASS INDEX: 28.31 KG/M2 | OXYGEN SATURATION: 96 % | WEIGHT: 154.8 LBS | HEART RATE: 86 BPM | SYSTOLIC BLOOD PRESSURE: 120 MMHG

## 2021-06-30 DIAGNOSIS — E11.9 TYPE 2 DIABETES MELLITUS WITHOUT COMPLICATION, WITHOUT LONG-TERM CURRENT USE OF INSULIN (HCC): Primary | ICD-10-CM

## 2021-06-30 DIAGNOSIS — I10 ESSENTIAL HYPERTENSION: ICD-10-CM

## 2021-06-30 DIAGNOSIS — K52.832 LYMPHOCYTIC COLITIS: ICD-10-CM

## 2021-06-30 LAB — HBA1C MFR BLD: 7 %

## 2021-06-30 PROCEDURE — 1090F PRES/ABSN URINE INCON ASSESS: CPT | Performed by: FAMILY MEDICINE

## 2021-06-30 PROCEDURE — 3051F HG A1C>EQUAL 7.0%<8.0%: CPT | Performed by: FAMILY MEDICINE

## 2021-06-30 PROCEDURE — 3017F COLORECTAL CA SCREEN DOC REV: CPT | Performed by: FAMILY MEDICINE

## 2021-06-30 PROCEDURE — 99213 OFFICE O/P EST LOW 20 MIN: CPT | Performed by: FAMILY MEDICINE

## 2021-06-30 PROCEDURE — 2022F DILAT RTA XM EVC RTNOPTHY: CPT | Performed by: FAMILY MEDICINE

## 2021-06-30 PROCEDURE — G8427 DOCREV CUR MEDS BY ELIG CLIN: HCPCS | Performed by: FAMILY MEDICINE

## 2021-06-30 PROCEDURE — 4040F PNEUMOC VAC/ADMIN/RCVD: CPT | Performed by: FAMILY MEDICINE

## 2021-06-30 PROCEDURE — 1123F ACP DISCUSS/DSCN MKR DOCD: CPT | Performed by: FAMILY MEDICINE

## 2021-06-30 PROCEDURE — 1036F TOBACCO NON-USER: CPT | Performed by: FAMILY MEDICINE

## 2021-06-30 PROCEDURE — G8417 CALC BMI ABV UP PARAM F/U: HCPCS | Performed by: FAMILY MEDICINE

## 2021-06-30 PROCEDURE — G8399 PT W/DXA RESULTS DOCUMENT: HCPCS | Performed by: FAMILY MEDICINE

## 2021-06-30 PROCEDURE — 83036 HEMOGLOBIN GLYCOSYLATED A1C: CPT | Performed by: FAMILY MEDICINE

## 2021-06-30 RX ORDER — ATORVASTATIN CALCIUM 20 MG/1
20 TABLET, FILM COATED ORAL DAILY
Qty: 30 TABLET | Refills: 3 | Status: SHIPPED | OUTPATIENT
Start: 2021-06-30 | End: 2021-10-01 | Stop reason: SDUPTHER

## 2021-06-30 RX ORDER — CHOLESTYRAMINE 4 G/9G
1 POWDER, FOR SUSPENSION ORAL 2 TIMES DAILY
Qty: 60 PACKET | Refills: 3 | Status: SHIPPED | OUTPATIENT
Start: 2021-06-30 | End: 2021-10-01 | Stop reason: SDUPTHER

## 2021-06-30 ASSESSMENT — ENCOUNTER SYMPTOMS
ABDOMINAL DISTENTION: 0
WHEEZING: 0
NAUSEA: 0
SORE THROAT: 0
COUGH: 0
ABDOMINAL PAIN: 0
VOMITING: 0
RHINORRHEA: 0
CONSTIPATION: 0
EYE REDNESS: 0
EYE DISCHARGE: 0
SHORTNESS OF BREATH: 0
PHOTOPHOBIA: 0
DIARRHEA: 0

## 2021-06-30 NOTE — PROGRESS NOTES
[facility]  [address] [place] [id number]  Dept: [address] Marie Tipton is a 65 y.o. female Established patient, who presents today for her medical conditions/complaints as noted below  Chief Complaint   Patient presents with    Diabetes     Patient doens't check blood sugar at home    Hypertension     Patient doesn't check B/P at home    Medication Check       HPI:     HPI Patient states that she is overall feeling well with no acute complaints. H/o T2DM, patient states that she does not routinely check her blood sugar. She currently takes Metformin 500 mg QD. Overall she is adhering to a healthy diet. Last A1C was 7.6. Today A1C is 7.0. H/o HTN, patient states that she does not routinely check her BP. Currently on Lisinopril / HCTZ. BP stable today at 120/68. H/o Sciatica, patient gets regular massages. States that she takes Meloxicam about 3-4 times weekly and her pain is very well controlled. Provided education on Tdap booster vaccine. Patient not interested today. She is due for her fasting lipid screen.          -  Reviewed prior notes None  Reviewed previous Labs    Hemoglobin A1C (%)   Date Value   06/30/2021 7.0   03/25/2021 7.3   12/23/2020 6.4             ( goal A1C is < 7)   No results found for: LABMICR  LDL Cholesterol (mg/dL)   Date Value   12/11/2018 85     LDL Calculated (mg/dL)   Date Value   11/20/2017 78   12/15/2016 91       (goal LDL is <100)   AST (U/L)   Date Value   01/21/2021 25     ALT (U/L)   Date Value   01/21/2021 25     BUN (mg/dL)   Date Value   01/21/2021 29 (H)     BP Readings from Last 3 Encounters:   06/30/21 120/68   03/25/21 138/70   02/02/21 120/70          (goal 140/90)    Past Medical History:   Diagnosis Date    Colon polyp     Diabetes mellitus (HCC)     Diarrhea     Gallstones     H/O neoplasm of uncertain behavior of skin     Hearing aid worn     B/l    Hearing loss, bilateral     Hyperglycemia 11/08/2015    Hypertension     Stye     right eye    Wears glasses         Social History     Tobacco Use    Smoking status: Never Smoker    Smokeless tobacco: Never Used   Substance Use Topics    Alcohol use: Yes     Comment: rare      Current Outpatient Medications   Medication Sig Dispense Refill    cholestyramine (QUESTRAN) 4 g packet Take 1 packet by mouth 2 times daily 60 packet 3    atorvastatin (LIPITOR) 20 MG tablet Take 1 tablet by mouth daily 30 tablet 3    lisinopril-hydroCHLOROthiazide (PRINZIDE;ZESTORETIC) 20-25 MG per tablet TAKE ONE TABLET BY MOUTH DAILY 90 tablet 3    meloxicam (MOBIC) 15 MG tablet Take 1 tablet by mouth daily as needed for pain 90 tablet 2    metFORMIN (GLUCOPHAGE-XR) 500 MG extended release tablet Take 1 tablet by mouth daily (with breakfast) 90 tablet 1    blood glucose test strips (TRUE METRIX BLOOD GLUCOSE TEST) strip 1 each by Miscell. (Med.Supl.;Non-Drugs) route daily 100 each 3    Probiotic Product (PROBIOTIC DAILY PO) Take by mouth daily       aspirin 81 MG EC tablet Take 81 mg by mouth daily.  Flaxseed, Linseed, 1000 MG CAPS Take 1 tablet by mouth.  Multiple Vitamins-Minerals (THERAPEUTIC MULTIVITAMIN-MINERALS) tablet Take 1 tablet by mouth daily.  cephALEXin (KEFLEX) 500 MG capsule 500 mgTake three times daily (Patient not taking: Reported on 3/25/2021) 21 capsule 0    ondansetron (ZOFRAN) 4 MG tablet Take every six hours as needed (Patient not taking: Reported on 6/30/2021) 20 tablet 0     No current facility-administered medications for this visit.      No Known Allergies    Health Maintenance   Topic Date Due    DTaP/Tdap/Td vaccine (1 - Tdap) Never done    Annual Wellness Visit (AWV)  Never done    Diabetic foot exam  06/04/2019    Lipid screen  12/11/2019    Diabetic retinal exam  08/11/2021    Diabetic microalbuminuria test  09/16/2021    Potassium monitoring  01/21/2022    Creatinine monitoring rhythm. Heart sounds: Normal heart sounds. Pulmonary:      Effort: Pulmonary effort is normal. No respiratory distress. Breath sounds: Normal breath sounds. No wheezing. Lymphadenopathy:      Cervical: No cervical adenopathy. Skin:     General: Skin is warm. Findings: No rash. Neurological:      Mental Status: She is alert and oriented to person, place, and time. Psychiatric:         Mood and Affect: Mood normal.         Behavior: Behavior normal.         Thought Content: Thought content normal.         Assessment/Plan:   1. Type 2 diabetes mellitus without complication, without long-term current use of insulin (HCC)  Assessment & Plan:   Well-controlled, changes made today: add lipitor daily  Orders:  -     POCT glycosylated hemoglobin (Hb A1C)  2. Essential hypertension  Assessment & Plan:   Well-controlled, continue current medications  3. Lymphocytic colitis  Assessment & Plan:   Uncontrolled, will try the Catherin Seton also and if not better try the steroid     Return in about 3 months (around 9/30/2021). Orders Placed This Encounter   Procedures    POCT glycosylated hemoglobin (Hb A1C)     Orders Placed This Encounter   Medications    cholestyramine (QUESTRAN) 4 g packet     Sig: Take 1 packet by mouth 2 times daily     Dispense:  60 packet     Refill:  3    atorvastatin (LIPITOR) 20 MG tablet     Sig: Take 1 tablet by mouth daily     Dispense:  30 tablet     Refill:  3       Patient given educational materials - see patient instructions. Discussed use, benefit, and side effects of prescribed medications. All patient questions answered. Pt voiced understanding. Reviewed health maintenance. Instructed to continue current medications, diet and exercise. Patient agreed with treatment plan. Follow up as directed.      Electronicallysigned by Rogelio Avila MD on 6/30/2021 at 1:44 PM

## 2021-07-06 RX ORDER — METFORMIN HYDROCHLORIDE 500 MG/1
500 TABLET, EXTENDED RELEASE ORAL
Qty: 90 TABLET | Refills: 1 | Status: SHIPPED | OUTPATIENT
Start: 2021-07-06 | End: 2021-10-01 | Stop reason: SDUPTHER

## 2021-07-09 ENCOUNTER — HOSPITAL ENCOUNTER (OUTPATIENT)
Dept: WOMENS IMAGING | Age: 71
Discharge: HOME | End: 2021-07-11
Payer: MEDICARE

## 2021-07-09 DIAGNOSIS — Z12.31 ENCOUNTER FOR SCREENING MAMMOGRAM FOR BREAST CANCER: ICD-10-CM

## 2021-07-09 PROCEDURE — 77063 BREAST TOMOSYNTHESIS BI: CPT

## 2021-10-01 ENCOUNTER — OFFICE VISIT (OUTPATIENT)
Dept: PRIMARY CARE CLINIC | Age: 71
End: 2021-10-01
Payer: MEDICARE

## 2021-10-01 VITALS
BODY MASS INDEX: 28.53 KG/M2 | DIASTOLIC BLOOD PRESSURE: 80 MMHG | SYSTOLIC BLOOD PRESSURE: 122 MMHG | HEART RATE: 86 BPM | OXYGEN SATURATION: 97 % | WEIGHT: 156 LBS

## 2021-10-01 DIAGNOSIS — E11.9 TYPE 2 DIABETES MELLITUS WITHOUT COMPLICATION, WITHOUT LONG-TERM CURRENT USE OF INSULIN (HCC): Primary | ICD-10-CM

## 2021-10-01 DIAGNOSIS — I10 ESSENTIAL HYPERTENSION: ICD-10-CM

## 2021-10-01 LAB
CREATININE URINE POCT: 200
HBA1C MFR BLD: 7.2 %
MICROALBUMIN/CREAT 24H UR: 10 MG/G{CREAT}
MICROALBUMIN/CREAT UR-RTO: <30

## 2021-10-01 PROCEDURE — G8427 DOCREV CUR MEDS BY ELIG CLIN: HCPCS | Performed by: FAMILY MEDICINE

## 2021-10-01 PROCEDURE — 82044 UR ALBUMIN SEMIQUANTITATIVE: CPT | Performed by: FAMILY MEDICINE

## 2021-10-01 PROCEDURE — 90694 VACC AIIV4 NO PRSRV 0.5ML IM: CPT | Performed by: FAMILY MEDICINE

## 2021-10-01 PROCEDURE — G8484 FLU IMMUNIZE NO ADMIN: HCPCS | Performed by: FAMILY MEDICINE

## 2021-10-01 PROCEDURE — 1123F ACP DISCUSS/DSCN MKR DOCD: CPT | Performed by: FAMILY MEDICINE

## 2021-10-01 PROCEDURE — 4040F PNEUMOC VAC/ADMIN/RCVD: CPT | Performed by: FAMILY MEDICINE

## 2021-10-01 PROCEDURE — 1036F TOBACCO NON-USER: CPT | Performed by: FAMILY MEDICINE

## 2021-10-01 PROCEDURE — 2022F DILAT RTA XM EVC RTNOPTHY: CPT | Performed by: FAMILY MEDICINE

## 2021-10-01 PROCEDURE — 1090F PRES/ABSN URINE INCON ASSESS: CPT | Performed by: FAMILY MEDICINE

## 2021-10-01 PROCEDURE — 99213 OFFICE O/P EST LOW 20 MIN: CPT | Performed by: FAMILY MEDICINE

## 2021-10-01 PROCEDURE — G8417 CALC BMI ABV UP PARAM F/U: HCPCS | Performed by: FAMILY MEDICINE

## 2021-10-01 PROCEDURE — 3017F COLORECTAL CA SCREEN DOC REV: CPT | Performed by: FAMILY MEDICINE

## 2021-10-01 PROCEDURE — 3051F HG A1C>EQUAL 7.0%<8.0%: CPT | Performed by: FAMILY MEDICINE

## 2021-10-01 PROCEDURE — G0008 ADMIN INFLUENZA VIRUS VAC: HCPCS | Performed by: FAMILY MEDICINE

## 2021-10-01 PROCEDURE — 83036 HEMOGLOBIN GLYCOSYLATED A1C: CPT | Performed by: FAMILY MEDICINE

## 2021-10-01 PROCEDURE — G8399 PT W/DXA RESULTS DOCUMENT: HCPCS | Performed by: FAMILY MEDICINE

## 2021-10-01 RX ORDER — CHOLESTYRAMINE 4 G/9G
1 POWDER, FOR SUSPENSION ORAL 3 TIMES DAILY
Qty: 90 PACKET | Refills: 3
Start: 2021-10-01 | End: 2022-08-23 | Stop reason: SDUPTHER

## 2021-10-01 RX ORDER — ATORVASTATIN CALCIUM 20 MG/1
20 TABLET, FILM COATED ORAL DAILY
Qty: 90 TABLET | Refills: 3 | Status: SHIPPED | OUTPATIENT
Start: 2021-10-01 | End: 2022-08-29 | Stop reason: SDUPTHER

## 2021-10-01 RX ORDER — LISINOPRIL AND HYDROCHLOROTHIAZIDE 25; 20 MG/1; MG/1
TABLET ORAL
Qty: 90 TABLET | Refills: 3 | Status: SHIPPED | OUTPATIENT
Start: 2021-10-01

## 2021-10-01 RX ORDER — METFORMIN HYDROCHLORIDE 500 MG/1
500 TABLET, EXTENDED RELEASE ORAL
Qty: 90 TABLET | Refills: 1 | Status: SHIPPED | OUTPATIENT
Start: 2021-10-01 | End: 2022-01-06 | Stop reason: SDUPTHER

## 2021-10-01 ASSESSMENT — ENCOUNTER SYMPTOMS
RHINORRHEA: 0
SORE THROAT: 0
COUGH: 0
DIARRHEA: 0
EYE DISCHARGE: 0
SHORTNESS OF BREATH: 0
EYE REDNESS: 0
NAUSEA: 0
VOMITING: 0
WHEEZING: 0
ABDOMINAL PAIN: 0

## 2021-10-01 NOTE — PROGRESS NOTES
[facility] PRIMARY [facility]ARE  [address] [address]  Dept: [address] Rambo Tiny Berg is a 66 y.o. female Established patient, who presents today for her medical conditions/complaints as noted below  Chief Complaint   Patient presents with    Diabetes     Patient doesn't check blood sugar at home    Medication Check    Hypertension     Patient doesn't check B/P at home    Medication Refill     Pending        HPI:     HPI  Pt states she has been doing well. Taking questran for diarrhea and that helps a lot.    Reviewed prior notes None  Reviewed previous Labs    Hemoglobin A1C (%)   Date Value   10/01/2021 7.2   06/30/2021 7.0   03/25/2021 7.3             ( goal A1C is < 7)   No results found for: LABMICR  LDL Cholesterol (mg/dL)   Date Value   12/11/2018 85     LDL Calculated (mg/dL)   Date Value   11/20/2017 78   12/15/2016 91       (goal LDL is <100)   AST (U/L)   Date Value   01/21/2021 25     ALT (U/L)   Date Value   01/21/2021 25     BUN (mg/dL)   Date Value   01/21/2021 29 (H)     BP Readings from Last 3 Encounters:   10/01/21 122/80   06/30/21 120/68   03/25/21 138/70          (goal 140/90)    Past Medical History:   Diagnosis Date    Colon polyp     Diabetes mellitus (HCC)     Diarrhea     Gallstones     H/O neoplasm of uncertain behavior of skin     Hearing aid worn     B/l    Hearing loss, bilateral     Hyperglycemia 11/08/2015    Hypertension     Stye     right eye    Wears glasses         Social History     Tobacco Use    Smoking status: Never Smoker    Smokeless tobacco: Never Used   Substance Use Topics    Alcohol use: Yes     Comment: rare      Current Outpatient Medications   Medication Sig Dispense Refill    atorvastatin (LIPITOR) 20 MG tablet Take 1 tablet by mouth daily 90 tablet 3    lisinopril-hydroCHLOROthiazide (PRINZIDE;ZESTORETIC) 20-25 MG per tablet TAKE ONE TABLET BY MOUTH DAILY 90 tablet 3    metFORMIN (GLUCOPHAGE-XR) 500 MG extended release tablet Take 1 tablet by mouth daily (with breakfast) 90 tablet 1    cholestyramine (QUESTRAN) 4 g packet Take 1 packet by mouth 3 times daily 90 packet 3    meloxicam (MOBIC) 15 MG tablet Take 1 tablet by mouth daily as needed for pain 90 tablet 2    blood glucose test strips (TRUE METRIX BLOOD GLUCOSE TEST) strip 1 each by Miscell. (Med.Supl.;Non-Drugs) route daily 100 each 3    Probiotic Product (PROBIOTIC DAILY PO) Take by mouth daily       aspirin 81 MG EC tablet Take 81 mg by mouth daily.  Flaxseed, Linseed, 1000 MG CAPS Take 1 tablet by mouth.  Multiple Vitamins-Minerals (THERAPEUTIC MULTIVITAMIN-MINERALS) tablet Take 1 tablet by mouth daily.  cephALEXin (KEFLEX) 500 MG capsule 500 mgTake three times daily (Patient not taking: Reported on 3/25/2021) 21 capsule 0    ondansetron (ZOFRAN) 4 MG tablet Take every six hours as needed (Patient not taking: Reported on 6/30/2021) 20 tablet 0     No current facility-administered medications for this visit.      No Known Allergies    Health Maintenance   Topic Date Due    DTaP/Tdap/Td vaccine (1 - Tdap) Never done    Annual Wellness Visit (AWV)  Never done    Diabetic foot exam  06/04/2019    Lipid screen  12/11/2019    Flu vaccine (1) 09/01/2021    Diabetic microalbuminuria test  09/16/2021    Potassium monitoring  01/21/2022    Creatinine monitoring  01/21/2022    Diabetic retinal exam  08/17/2022    A1C test (Diabetic or Prediabetic)  10/01/2022    Breast cancer screen  07/09/2023    Colon cancer screen colonoscopy  02/01/2031    DEXA (modify frequency per FRAX score)  Completed    Shingles Vaccine  Completed    Pneumococcal 65+ years Vaccine  Completed    COVID-19 Vaccine  Completed    Hepatitis C screen  Completed    Hepatitis A vaccine  Aged Out    Hib vaccine  Aged Out    Meningococcal (ACWY) vaccine  Aged Out       Subjective:     Review of Systems   Constitutional: Negative for chills and fever. HENT: Negative for rhinorrhea and sore throat. Eyes: Negative for discharge and redness. Respiratory: Negative for cough, shortness of breath and wheezing. Cardiovascular: Negative for chest pain and palpitations. Gastrointestinal: Negative for abdominal pain, diarrhea, nausea and vomiting. Genitourinary: Negative for dysuria and frequency. Musculoskeletal: Negative for arthralgias and myalgias. Neurological: Negative for dizziness, light-headedness and headaches. Psychiatric/Behavioral: Negative for sleep disturbance. Objective:     /80   Pulse 86   Wt 156 lb (70.8 kg)   SpO2 97%   BMI 28.53 kg/m²   Physical Exam  Vitals and nursing note reviewed. Constitutional:       General: She is not in acute distress. Appearance: She is well-developed. She is not ill-appearing. HENT:      Head: Normocephalic and atraumatic. Right Ear: External ear normal.      Left Ear: External ear normal.   Eyes:      General: No scleral icterus. Right eye: No discharge. Left eye: No discharge. Conjunctiva/sclera: Conjunctivae normal.      Pupils: Pupils are equal, round, and reactive to light. Neck:      Thyroid: No thyromegaly. Trachea: No tracheal deviation. Cardiovascular:      Rate and Rhythm: Normal rate and regular rhythm. Heart sounds: Normal heart sounds. Pulmonary:      Effort: Pulmonary effort is normal. No respiratory distress. Breath sounds: Normal breath sounds. No wheezing. Lymphadenopathy:      Cervical: No cervical adenopathy. Skin:     General: Skin is warm. Findings: No rash. Neurological:      Mental Status: She is alert and oriented to person, place, and time. Psychiatric:         Mood and Affect: Mood normal.         Behavior: Behavior normal.         Thought Content: Thought content normal.         Assessment/Plan:   1.  Type 2 diabetes mellitus without complication, without long-term current use of

## 2021-10-20 ENCOUNTER — OFFICE VISIT (OUTPATIENT)
Dept: PRIMARY CARE CLINIC | Age: 71
End: 2021-10-20
Payer: MEDICARE

## 2021-10-20 DIAGNOSIS — Z23 NEED FOR VACCINATION: Primary | ICD-10-CM

## 2021-10-20 PROCEDURE — 99999 PR OFFICE/OUTPT VISIT,PROCEDURE ONLY: CPT | Performed by: FAMILY MEDICINE

## 2021-10-20 PROCEDURE — 0003A COVID-19, PFIZER VACCINE 30MCG/0.3ML DOSE: CPT | Performed by: FAMILY MEDICINE

## 2021-10-20 PROCEDURE — 91300 COVID-19, PFIZER VACCINE 30MCG/0.3ML DOSE: CPT | Performed by: FAMILY MEDICINE

## 2021-12-16 DIAGNOSIS — E11.9 TYPE 2 DIABETES MELLITUS WITHOUT COMPLICATION, WITHOUT LONG-TERM CURRENT USE OF INSULIN (HCC): ICD-10-CM

## 2021-12-16 DIAGNOSIS — R73.9 HYPERGLYCEMIA: ICD-10-CM

## 2021-12-17 RX ORDER — CALCIUM CITRATE/VITAMIN D3 200MG-6.25
1 TABLET ORAL DAILY
Qty: 100 EACH | Refills: 3 | Status: SHIPPED | OUTPATIENT
Start: 2021-12-17

## 2022-01-06 ENCOUNTER — OFFICE VISIT (OUTPATIENT)
Dept: PRIMARY CARE CLINIC | Age: 72
End: 2022-01-06
Payer: MEDICARE

## 2022-01-06 VITALS
BODY MASS INDEX: 28.68 KG/M2 | DIASTOLIC BLOOD PRESSURE: 60 MMHG | HEART RATE: 90 BPM | SYSTOLIC BLOOD PRESSURE: 120 MMHG | WEIGHT: 156.8 LBS | OXYGEN SATURATION: 98 %

## 2022-01-06 DIAGNOSIS — E11.9 TYPE 2 DIABETES MELLITUS WITHOUT COMPLICATION, WITHOUT LONG-TERM CURRENT USE OF INSULIN (HCC): Primary | ICD-10-CM

## 2022-01-06 DIAGNOSIS — I10 PRIMARY HYPERTENSION: ICD-10-CM

## 2022-01-06 LAB — HBA1C MFR BLD: 8.2 %

## 2022-01-06 PROCEDURE — 1036F TOBACCO NON-USER: CPT | Performed by: FAMILY MEDICINE

## 2022-01-06 PROCEDURE — 83036 HEMOGLOBIN GLYCOSYLATED A1C: CPT | Performed by: FAMILY MEDICINE

## 2022-01-06 PROCEDURE — 1123F ACP DISCUSS/DSCN MKR DOCD: CPT | Performed by: FAMILY MEDICINE

## 2022-01-06 PROCEDURE — 99213 OFFICE O/P EST LOW 20 MIN: CPT | Performed by: FAMILY MEDICINE

## 2022-01-06 PROCEDURE — 3017F COLORECTAL CA SCREEN DOC REV: CPT | Performed by: FAMILY MEDICINE

## 2022-01-06 PROCEDURE — G8427 DOCREV CUR MEDS BY ELIG CLIN: HCPCS | Performed by: FAMILY MEDICINE

## 2022-01-06 PROCEDURE — G8399 PT W/DXA RESULTS DOCUMENT: HCPCS | Performed by: FAMILY MEDICINE

## 2022-01-06 PROCEDURE — 4040F PNEUMOC VAC/ADMIN/RCVD: CPT | Performed by: FAMILY MEDICINE

## 2022-01-06 PROCEDURE — G8484 FLU IMMUNIZE NO ADMIN: HCPCS | Performed by: FAMILY MEDICINE

## 2022-01-06 PROCEDURE — 2022F DILAT RTA XM EVC RTNOPTHY: CPT | Performed by: FAMILY MEDICINE

## 2022-01-06 PROCEDURE — G8417 CALC BMI ABV UP PARAM F/U: HCPCS | Performed by: FAMILY MEDICINE

## 2022-01-06 PROCEDURE — 3052F HG A1C>EQUAL 8.0%<EQUAL 9.0%: CPT | Performed by: FAMILY MEDICINE

## 2022-01-06 PROCEDURE — 1090F PRES/ABSN URINE INCON ASSESS: CPT | Performed by: FAMILY MEDICINE

## 2022-01-06 RX ORDER — METFORMIN HYDROCHLORIDE 500 MG/1
1000 TABLET, EXTENDED RELEASE ORAL
Qty: 180 TABLET | Refills: 3 | Status: SHIPPED | OUTPATIENT
Start: 2022-01-06

## 2022-01-06 SDOH — ECONOMIC STABILITY: FOOD INSECURITY: WITHIN THE PAST 12 MONTHS, YOU WORRIED THAT YOUR FOOD WOULD RUN OUT BEFORE YOU GOT MONEY TO BUY MORE.: NEVER TRUE

## 2022-01-06 SDOH — ECONOMIC STABILITY: FOOD INSECURITY: WITHIN THE PAST 12 MONTHS, THE FOOD YOU BOUGHT JUST DIDN'T LAST AND YOU DIDN'T HAVE MONEY TO GET MORE.: NEVER TRUE

## 2022-01-06 ASSESSMENT — PATIENT HEALTH QUESTIONNAIRE - PHQ9
SUM OF ALL RESPONSES TO PHQ QUESTIONS 1-9: 0
SUM OF ALL RESPONSES TO PHQ9 QUESTIONS 1 & 2: 0
SUM OF ALL RESPONSES TO PHQ QUESTIONS 1-9: 0
2. FEELING DOWN, DEPRESSED OR HOPELESS: 0
SUM OF ALL RESPONSES TO PHQ QUESTIONS 1-9: 0
1. LITTLE INTEREST OR PLEASURE IN DOING THINGS: 0
SUM OF ALL RESPONSES TO PHQ QUESTIONS 1-9: 0

## 2022-01-06 ASSESSMENT — ENCOUNTER SYMPTOMS
DIARRHEA: 1
NAUSEA: 0
RHINORRHEA: 0
EYE REDNESS: 0
SORE THROAT: 0
COUGH: 0
EYE DISCHARGE: 0
SHORTNESS OF BREATH: 0
WHEEZING: 0
ABDOMINAL PAIN: 0
VOMITING: 0

## 2022-01-06 ASSESSMENT — SOCIAL DETERMINANTS OF HEALTH (SDOH): HOW HARD IS IT FOR YOU TO PAY FOR THE VERY BASICS LIKE FOOD, HOUSING, MEDICAL CARE, AND HEATING?: NOT HARD AT ALL

## 2022-01-06 NOTE — PROGRESS NOTES
[facility] PRIMARY [facility]ARE  [address] [address]  Dept: [address] Marie Barlow is a 66 y.o. female Established patient, who presents today for her medical conditions/complaints as noted below  Chief Complaint   Patient presents with    Diabetes     Patient checks blood sugar at times    Hypertension     Patient doesn't check B/P at home    Medication Check       HPI:     HPI  Sugars at home running high in the mornings- sometimes even over 200. Can't pinpoint the reason for it. Switched metformin to evening meal and did not notice any difference.        Reviewed prior notes None  Reviewed previous Labs    Hemoglobin A1C (%)   Date Value   01/06/2022 8.2   10/01/2021 7.2   06/30/2021 7.0             ( goal A1C is < 7)   No results found for: LABMICR  LDL Cholesterol (mg/dL)   Date Value   12/11/2018 85     LDL Calculated (mg/dL)   Date Value   11/20/2017 78   12/15/2016 91       (goal LDL is <100)   AST (U/L)   Date Value   01/21/2021 25     ALT (U/L)   Date Value   01/21/2021 25     BUN (mg/dL)   Date Value   01/21/2021 29 (H)     BP Readings from Last 3 Encounters:   01/06/22 120/60   10/01/21 122/80   06/30/21 120/68          (goal 140/90)    Past Medical History:   Diagnosis Date    Colon polyp     Diabetes mellitus (HCC)     Diarrhea     Gallstones     H/O neoplasm of uncertain behavior of skin     Hearing aid worn     B/l    Hearing loss, bilateral     Hyperglycemia 11/08/2015    Hypertension     Stye     right eye    Wears glasses         Social History     Tobacco Use    Smoking status: Never Smoker    Smokeless tobacco: Never Used   Substance Use Topics    Alcohol use: Yes     Comment: rare      Current Outpatient Medications   Medication Sig Dispense Refill    metFORMIN (GLUCOPHAGE-XR) 500 MG extended release tablet Take 2 tablets by mouth daily (with breakfast) 180 tablet 3    blood glucose test strips (TRUE METRIX BLOOD GLUCOSE TEST) strip 1 each by Miscell. (Med.Supl.;Non-Drugs) route daily 100 each 3    atorvastatin (LIPITOR) 20 MG tablet Take 1 tablet by mouth daily 90 tablet 3    lisinopril-hydroCHLOROthiazide (PRINZIDE;ZESTORETIC) 20-25 MG per tablet TAKE ONE TABLET BY MOUTH DAILY 90 tablet 3    meloxicam (MOBIC) 15 MG tablet Take 1 tablet by mouth daily as needed for pain 90 tablet 2    Probiotic Product (PROBIOTIC DAILY PO) Take by mouth daily       aspirin 81 MG EC tablet Take 81 mg by mouth daily.  Flaxseed, Linseed, 1000 MG CAPS Take 1 tablet by mouth.  Multiple Vitamins-Minerals (THERAPEUTIC MULTIVITAMIN-MINERALS) tablet Take 1 tablet by mouth daily.  cholestyramine (QUESTRAN) 4 g packet Take 1 packet by mouth 3 times daily 90 packet 3     No current facility-administered medications for this visit. No Known Allergies    Health Maintenance   Topic Date Due    DTaP/Tdap/Td vaccine (1 - Tdap) Never done    Diabetic foot exam  06/04/2019    Lipid screen  12/11/2019    Annual Wellness Visit (AWV)  09/23/2020    Potassium monitoring  01/21/2022    Creatinine monitoring  01/21/2022    Depression Screen  03/25/2022    Diabetic retinal exam  08/17/2022    Diabetic microalbuminuria test  10/01/2022    A1C test (Diabetic or Prediabetic)  01/06/2023    Breast cancer screen  07/09/2023    Colon cancer screen colonoscopy  02/01/2031    DEXA (modify frequency per FRAX score)  Completed    Flu vaccine  Completed    Shingles Vaccine  Completed    Pneumococcal 65+ years Vaccine  Completed    COVID-19 Vaccine  Completed    Hepatitis C screen  Completed    Hepatitis A vaccine  Aged Out    Hib vaccine  Aged Out    Meningococcal (ACWY) vaccine  Aged Out       Subjective:     Review of Systems   Constitutional: Negative for chills and fever. HENT: Negative for rhinorrhea and sore throat. Eyes: Negative for discharge and redness.    Respiratory: Negative for cough, shortness of breath and wheezing. Cardiovascular: Negative for chest pain and palpitations. Gastrointestinal: Positive for diarrhea (better with cholestyramine- not needing anymore. ). Negative for abdominal pain, nausea and vomiting. Genitourinary: Negative for dysuria and frequency. Musculoskeletal: Negative for arthralgias and myalgias. Neurological: Negative for dizziness, light-headedness and headaches. Psychiatric/Behavioral: Negative for sleep disturbance. Objective:     /60   Pulse 90   Wt 156 lb 12.8 oz (71.1 kg)   SpO2 98%   BMI 28.68 kg/m²   Physical Exam  Vitals and nursing note reviewed. Constitutional:       General: She is not in acute distress. Appearance: She is well-developed. She is not ill-appearing. HENT:      Head: Normocephalic and atraumatic. Right Ear: External ear normal.      Left Ear: External ear normal.   Eyes:      General: No scleral icterus. Right eye: No discharge. Left eye: No discharge. Conjunctiva/sclera: Conjunctivae normal.      Pupils: Pupils are equal, round, and reactive to light. Neck:      Thyroid: No thyromegaly. Trachea: No tracheal deviation. Cardiovascular:      Rate and Rhythm: Normal rate and regular rhythm. Heart sounds: Normal heart sounds. Pulmonary:      Effort: Pulmonary effort is normal. No respiratory distress. Breath sounds: Normal breath sounds. No wheezing. Lymphadenopathy:      Cervical: No cervical adenopathy. Skin:     General: Skin is warm. Findings: No rash. Neurological:      Mental Status: She is alert and oriented to person, place, and time. Psychiatric:         Mood and Affect: Mood normal.         Behavior: Behavior normal.         Thought Content: Thought content normal.         Assessment/Plan:   1. Type 2 diabetes mellitus without complication, without long-term current use of insulin (HCC)  -     POCT glycosylated hemoglobin (Hb A1C)  2.  Primary hypertension  Assessment & Plan:   Well-controlled, continue current medications     Return in about 3 months (around 4/6/2022) for HTN f/u, DM check. Orders Placed This Encounter   Procedures    POCT glycosylated hemoglobin (Hb A1C)     Orders Placed This Encounter   Medications    metFORMIN (GLUCOPHAGE-XR) 500 MG extended release tablet     Sig: Take 2 tablets by mouth daily (with breakfast)     Dispense:  180 tablet     Refill:  3       Patient given educational materials - see patient instructions. Discussed use, benefit, and side effects of prescribed medications. All patient questions answered. Pt voiced understanding. Reviewed health maintenance. Instructed to continue current medications, diet and exercise. Patient agreed with treatment plan. Follow up as directed.      Electronicallysigned by Allan Smith MD on 1/6/2022 at 1:33 PM

## 2022-04-08 ENCOUNTER — OFFICE VISIT (OUTPATIENT)
Dept: PRIMARY CARE CLINIC | Age: 72
End: 2022-04-08
Payer: MEDICARE

## 2022-04-08 VITALS
HEIGHT: 63 IN | HEART RATE: 84 BPM | SYSTOLIC BLOOD PRESSURE: 122 MMHG | WEIGHT: 155.2 LBS | DIASTOLIC BLOOD PRESSURE: 70 MMHG | BODY MASS INDEX: 27.5 KG/M2

## 2022-04-08 DIAGNOSIS — E11.9 TYPE 2 DIABETES MELLITUS WITHOUT COMPLICATION, WITHOUT LONG-TERM CURRENT USE OF INSULIN (HCC): Primary | ICD-10-CM

## 2022-04-08 DIAGNOSIS — I10 PRIMARY HYPERTENSION: ICD-10-CM

## 2022-04-08 LAB — HBA1C MFR BLD: 7.7 %

## 2022-04-08 PROCEDURE — G8427 DOCREV CUR MEDS BY ELIG CLIN: HCPCS | Performed by: FAMILY MEDICINE

## 2022-04-08 PROCEDURE — 1036F TOBACCO NON-USER: CPT | Performed by: FAMILY MEDICINE

## 2022-04-08 PROCEDURE — 3052F HG A1C>EQUAL 8.0%<EQUAL 9.0%: CPT | Performed by: FAMILY MEDICINE

## 2022-04-08 PROCEDURE — 1123F ACP DISCUSS/DSCN MKR DOCD: CPT | Performed by: FAMILY MEDICINE

## 2022-04-08 PROCEDURE — G8417 CALC BMI ABV UP PARAM F/U: HCPCS | Performed by: FAMILY MEDICINE

## 2022-04-08 PROCEDURE — 3017F COLORECTAL CA SCREEN DOC REV: CPT | Performed by: FAMILY MEDICINE

## 2022-04-08 PROCEDURE — 99213 OFFICE O/P EST LOW 20 MIN: CPT | Performed by: FAMILY MEDICINE

## 2022-04-08 PROCEDURE — 1090F PRES/ABSN URINE INCON ASSESS: CPT | Performed by: FAMILY MEDICINE

## 2022-04-08 PROCEDURE — G8399 PT W/DXA RESULTS DOCUMENT: HCPCS | Performed by: FAMILY MEDICINE

## 2022-04-08 PROCEDURE — 83036 HEMOGLOBIN GLYCOSYLATED A1C: CPT | Performed by: FAMILY MEDICINE

## 2022-04-08 PROCEDURE — 4040F PNEUMOC VAC/ADMIN/RCVD: CPT | Performed by: FAMILY MEDICINE

## 2022-04-08 PROCEDURE — 2022F DILAT RTA XM EVC RTNOPTHY: CPT | Performed by: FAMILY MEDICINE

## 2022-04-08 RX ORDER — CANAGLIFLOZIN 300 MG/1
300 TABLET, FILM COATED ORAL
Qty: 30 TABLET | Refills: 1 | Status: SHIPPED | OUTPATIENT
Start: 2022-04-08 | End: 2022-06-16

## 2022-04-08 ASSESSMENT — ENCOUNTER SYMPTOMS
SHORTNESS OF BREATH: 0
EYE DISCHARGE: 0
VOMITING: 0
DIARRHEA: 0
SORE THROAT: 0
ABDOMINAL PAIN: 0
NAUSEA: 0
EYE REDNESS: 0
WHEEZING: 0
RHINORRHEA: 0
COUGH: 0

## 2022-04-08 NOTE — PROGRESS NOTES
[facility] PRIMARY CARE  [address] [place] [id number]  Dept: [address] Rambo Gray Dee is a 66 y.o. female Established patient, who presents today for her medical conditions/complaints as noted below  Chief Complaint   Patient presents with    Hypertension     Patient doesn't check B/P at home    Diabetes     Patient checks blood sugar at times       HPI:     HPI  Pt doing okay. Feels fine. No new issues. Reviewed prior notes None  Reviewed previous Labs    Hemoglobin A1C (%)   Date Value   04/08/2022 7.7   01/06/2022 8.2   10/01/2021 7.2             ( goal A1C is < 7)   No results found for: LABMICR  LDL Cholesterol (mg/dL)   Date Value   12/11/2018 85     LDL Calculated (mg/dL)   Date Value   11/20/2017 78   12/15/2016 91       (goal LDL is <100)   AST (U/L)   Date Value   01/21/2021 25     ALT (U/L)   Date Value   01/21/2021 25     BUN (mg/dL)   Date Value   01/21/2021 29 (H)     BP Readings from Last 3 Encounters:   04/08/22 122/70   01/06/22 120/60   10/01/21 122/80          (goal 140/90)    Past Medical History:   Diagnosis Date    Colon polyp     Diabetes mellitus ([address]     Diarrhea     Gallstones     H/O neoplasm of uncertain behavior of skin     Hearing aid worn     B/l    Hearing loss, bilateral     Hyperglycemia 11/08/2015    Hypertension     Stye     right eye    Wears glasses         Social History     Tobacco Use    Smoking status: Never Smoker    Smokeless tobacco: Never Used   Substance Use Topics    Alcohol use: Yes     Comment: rare      Current Outpatient Medications   Medication Sig Dispense Refill    canagliflozin (INVOKANA) 300 MG TABS tablet Take 1 tablet by mouth every morning (before breakfast) 30 tablet 1    metFORMIN (GLUCOPHAGE-XR) 500 MG extended release tablet Take 2 tablets by mouth daily (with breakfast) 180 tablet 3    blood glucose test strips (TRUE METRIX BLOOD GLUCOSE TEST) strip 1 each by Eve. and palpitations. Gastrointestinal: Negative for abdominal pain, diarrhea, nausea and vomiting. Genitourinary: Negative for dysuria and frequency. Musculoskeletal: Negative for arthralgias and myalgias. Neurological: Negative for dizziness, light-headedness and headaches. Psychiatric/Behavioral: Negative for sleep disturbance. Objective:     /70   Pulse 84   Ht 5' 3\" (1.6 m)   Wt 155 lb 3.2 oz (70.4 kg)   BMI 27.49 kg/m²   Physical Exam  Vitals and nursing note reviewed. Constitutional:       General: She is not in acute distress. Appearance: She is well-developed. She is not ill-appearing. HENT:      Head: Normocephalic and atraumatic. Right Ear: External ear normal.      Left Ear: External ear normal.   Eyes:      General: No scleral icterus. Right eye: No discharge. Left eye: No discharge. Conjunctiva/sclera: Conjunctivae normal.      Pupils: Pupils are equal, round, and reactive to light. Neck:      Thyroid: No thyromegaly. Trachea: No tracheal deviation. Cardiovascular:      Rate and Rhythm: Normal rate and regular rhythm. Heart sounds: Normal heart sounds. Pulmonary:      Effort: Pulmonary effort is normal. No respiratory distress. Breath sounds: Normal breath sounds. No wheezing. Lymphadenopathy:      Cervical: No cervical adenopathy. Skin:     General: Skin is warm. Findings: No rash. Neurological:      Mental Status: She is alert and oriented to person, place, and time. Psychiatric:         Mood and Affect: Mood normal.         Behavior: Behavior normal.         Thought Content: Thought content normal.         Assessment/Plan:   1. Type 2 diabetes mellitus without complication, without long-term current use of insulin ([facility])  Assessment & Plan:   Borderline controlled, continue current medications and add invokana and if not better will try oxempic if covered.    Orders:  -     POCT glycosylated hemoglobin (Hb A1C)  2. Primary hypertension  Assessment & Plan:   Well-controlled, continue current medications     Return in about 3 months (around 7/8/2022) for DM check, HTN f/u. Orders Placed This Encounter   Procedures    POCT glycosylated hemoglobin (Hb A1C)     Orders Placed This Encounter   Medications    canagliflozin (INVOKANA) 300 MG TABS tablet     Sig: Take 1 tablet by mouth every morning (before breakfast)     Dispense:  30 tablet     Refill:  1       Patient given educational materials - see patient instructions. Discussed use, benefit, and side effects of prescribed medications. All patient questions answered. Pt voiced understanding. Reviewed health maintenance. Instructed to continue current medications, diet and exercise. Patient agreed with treatment plan. Follow up as directed.      Electronicallysigned by Kiarra Maria MD on 4/8/2022 at 1:27 PM

## 2022-04-08 NOTE — ASSESSMENT & PLAN NOTE
Borderline controlled, continue current medications and add invokana and if not better will try oxempic if covered.

## 2022-05-25 ENCOUNTER — PATIENT MESSAGE (OUTPATIENT)
Dept: PRIMARY CARE CLINIC | Age: 72
End: 2022-05-25

## 2022-05-25 DIAGNOSIS — M54.31 SCIATICA OF RIGHT SIDE: Primary | ICD-10-CM

## 2022-05-25 NOTE — TELEPHONE ENCOUNTER
From: Melia Donnelly  To: Dr. Ying Roth: 5/25/2022 3:59 PM EDT  Subject: Prescription for Massage    Please provide a prescription for a medical massage. I have changed massage therapists and require a new prescription. This can be sent to me via [employer] Mail. Any questions, please advise.     Elana Gleason

## 2022-06-16 ENCOUNTER — TELEPHONE (OUTPATIENT)
Dept: PRIMARY CARE CLINIC | Age: 72
End: 2022-06-16

## 2022-06-16 RX ORDER — CANAGLIFLOZIN 300 MG/1
300 TABLET, FILM COATED ORAL
Qty: 30 TABLET | Refills: 3 | Status: SHIPPED | OUTPATIENT
Start: 2022-06-16 | End: 2022-07-18 | Stop reason: SDUPTHER

## 2022-06-16 NOTE — TELEPHONE ENCOUNTER
Cough, sinus congestion, sore throat and headache started on Tues, 6/14/22. Currently she only has sinus congestions and headache. Patient has been taking tylenol and is feeling better today. I informed patient patient about the antiviral covid treatment and that it needs to be started within 5 days of first symptom. Patient would like your opinion of what she should do.

## 2022-06-16 NOTE — TELEPHONE ENCOUNTER
----- Message from Michael Barr sent at 6/16/2022  8:56 AM EDT -----  Subject: Message to Provider    QUESTIONS  Information for Provider? tested positive for covid on 6/15, would like to   know next steps  ---------------------------------------------------------------------------  --------------  CALL BACK INFO  What is the best way for the office to contact you? OK to leave message on   voicemail  Preferred Call Back Phone Number?  [phone]  ---------------------------------------------------------------------------  --------------  SCRIPT ANSWERS  undefined

## 2022-06-16 NOTE — TELEPHONE ENCOUNTER
Please get more info- symptoms, how long, does she want to start antiviral or just let it run it's course. Brianna Gan

## 2022-07-18 ENCOUNTER — OFFICE VISIT (OUTPATIENT)
Dept: PRIMARY CARE CLINIC | Age: 72
End: 2022-07-18
Payer: MEDICARE

## 2022-07-18 VITALS
SYSTOLIC BLOOD PRESSURE: 110 MMHG | HEART RATE: 78 BPM | OXYGEN SATURATION: 99 % | WEIGHT: 148.6 LBS | DIASTOLIC BLOOD PRESSURE: 60 MMHG | BODY MASS INDEX: 26.32 KG/M2

## 2022-07-18 DIAGNOSIS — E11.9 TYPE 2 DIABETES MELLITUS WITHOUT COMPLICATION, WITHOUT LONG-TERM CURRENT USE OF INSULIN (HCC): Primary | ICD-10-CM

## 2022-07-18 DIAGNOSIS — I10 PRIMARY HYPERTENSION: ICD-10-CM

## 2022-07-18 LAB — HBA1C MFR BLD: 6.9 %

## 2022-07-18 PROCEDURE — 83036 HEMOGLOBIN GLYCOSYLATED A1C: CPT | Performed by: FAMILY MEDICINE

## 2022-07-18 PROCEDURE — 3017F COLORECTAL CA SCREEN DOC REV: CPT | Performed by: FAMILY MEDICINE

## 2022-07-18 PROCEDURE — 1090F PRES/ABSN URINE INCON ASSESS: CPT | Performed by: FAMILY MEDICINE

## 2022-07-18 PROCEDURE — 2022F DILAT RTA XM EVC RTNOPTHY: CPT | Performed by: FAMILY MEDICINE

## 2022-07-18 PROCEDURE — 99213 OFFICE O/P EST LOW 20 MIN: CPT | Performed by: FAMILY MEDICINE

## 2022-07-18 PROCEDURE — G8427 DOCREV CUR MEDS BY ELIG CLIN: HCPCS | Performed by: FAMILY MEDICINE

## 2022-07-18 PROCEDURE — 1036F TOBACCO NON-USER: CPT | Performed by: FAMILY MEDICINE

## 2022-07-18 PROCEDURE — G8399 PT W/DXA RESULTS DOCUMENT: HCPCS | Performed by: FAMILY MEDICINE

## 2022-07-18 PROCEDURE — 1123F ACP DISCUSS/DSCN MKR DOCD: CPT | Performed by: FAMILY MEDICINE

## 2022-07-18 PROCEDURE — G8417 CALC BMI ABV UP PARAM F/U: HCPCS | Performed by: FAMILY MEDICINE

## 2022-07-18 PROCEDURE — 3044F HG A1C LEVEL LT 7.0%: CPT | Performed by: FAMILY MEDICINE

## 2022-07-18 RX ORDER — CANAGLIFLOZIN 300 MG/1
300 TABLET, FILM COATED ORAL
Qty: 90 TABLET | Refills: 3 | Status: SHIPPED | OUTPATIENT
Start: 2022-07-18

## 2022-07-18 ASSESSMENT — ENCOUNTER SYMPTOMS
WHEEZING: 0
EYE DISCHARGE: 0
EYE REDNESS: 0
NAUSEA: 0
RHINORRHEA: 0
ABDOMINAL PAIN: 0
COUGH: 0
SORE THROAT: 0
VOMITING: 0
DIARRHEA: 0
SHORTNESS OF BREATH: 0

## 2022-07-18 NOTE — PROGRESS NOTES
[address] [facility] PRIMARY CARE  [address] [address]  Dept: [address] Marie Delvalle is a 66 y.o. female Established patient, who presents today for her medical conditions/complaints as noted below  Chief Complaint   Patient presents with    Hypertension     Patient doesn't check B/P at home    Diabetes     Patient checks blood sugar at times        HPI:     HPI  Pt doing okay post covid- was all in her head, did not move into her chest at all. No SOB. Has not checked sugars at home for a while.         Reviewed prior notes:  None  Reviewed previous:  Labs    Hemoglobin A1C (%)   Date Value   07/18/2022 6.9   04/08/2022 7.7   01/06/2022 8.2             ( goal A1C is < 7)   No results found for: LABMICR  LDL Cholesterol (mg/dL)   Date Value   12/11/2018 85     LDL Calculated (mg/dL)   Date Value   11/20/2017 78   12/15/2016 91       (goal LDL is <100)   AST (U/L)   Date Value   01/21/2021 25     ALT (U/L)   Date Value   01/21/2021 25     BUN (mg/dL)   Date Value   01/21/2021 29 (H)     BP Readings from Last 3 Encounters:   07/18/22 110/60   04/08/22 122/70   01/06/22 120/60          (goal 140/90)    Past Medical History:   Diagnosis Date    Colon polyp     Diabetes mellitus ([facility] [address]     Diarrhea     Gallstones     H/O neoplasm of uncertain behavior of skin     Hearing aid worn     B/l    Hearing loss, bilateral     Hyperglycemia 11/08/2015    Hypertension     Stye     right eye    Wears glasses         Social History     Tobacco Use    Smoking status: Never    Smokeless tobacco: Never   Substance Use Topics    Alcohol use: Yes     Comment: rare      Current Outpatient Medications   Medication Sig Dispense Refill    canagliflozin (INVOKANA) 300 MG TABS tablet Take 1 tablet by mouth every morning (before breakfast) 90 tablet 3    metFORMIN (GLUCOPHAGE-XR) 500 MG extended release tablet Take 2 tablets by mouth daily (with breakfast) 180 tablet 3    blood glucose test strips (TRUE METRIX BLOOD GLUCOSE TEST) strip 1 each by Miscell. (Med.Supl.;Non-Drugs) route daily 100 each 3    atorvastatin (LIPITOR) 20 MG tablet Take 1 tablet by mouth daily 90 tablet 3    lisinopril-hydroCHLOROthiazide (PRINZIDE;ZESTORETIC) 20-25 MG per tablet TAKE ONE TABLET BY MOUTH DAILY 90 tablet 3    cholestyramine (QUESTRAN) 4 g packet Take 1 packet by mouth 3 times daily 90 packet 3    meloxicam (MOBIC) 15 MG tablet Take 1 tablet by mouth daily as needed for pain 90 tablet 2    Probiotic Product (PROBIOTIC DAILY PO) Take by mouth daily       aspirin 81 MG EC tablet Take 81 mg by mouth daily. Flaxseed, Linseed, 1000 MG CAPS Take 1 tablet by mouth. Multiple Vitamins-Minerals (THERAPEUTIC MULTIVITAMIN-MINERALS) tablet Take 1 tablet by mouth daily. No current facility-administered medications for this visit. No Known Allergies    Health Maintenance   Topic Date Due    DTaP/Tdap/Td vaccine (1 - Tdap) Never done    Diabetic foot exam  06/04/2019    Lipids  12/11/2019    Annual Wellness Visit (AWV)  09/23/2020    COVID-19 Vaccine (4 - Booster for Pfizer series) 02/20/2022    Diabetic retinal exam  08/17/2022    Flu vaccine (1) 09/01/2022    Diabetic microalbuminuria test  10/01/2022    Depression Screen  01/06/2023    Breast cancer screen  07/09/2023    A1C test (Diabetic or Prediabetic)  07/18/2023    Colorectal Cancer Screen  02/01/2031    DEXA (modify frequency per FRAX score)  Completed    Shingles vaccine  Completed    Pneumococcal 65+ years Vaccine  Completed    Hepatitis C screen  Completed    Hepatitis A vaccine  Aged Out    Hib vaccine  Aged Out    Meningococcal (ACWY) vaccine  Aged Out       Subjective:     Review of Systems   Constitutional:  Negative for chills and fever. HENT:  Negative for rhinorrhea and sore throat. Eyes:  Negative for discharge and redness. Respiratory:  Negative for cough, shortness of breath and wheezing.     Cardiovascular:  Negative for chest pain and palpitations. Gastrointestinal:  Negative for abdominal pain, diarrhea, nausea and vomiting. Genitourinary:  Negative for dysuria and frequency. Musculoskeletal:  Negative for arthralgias and myalgias. Neurological:  Negative for dizziness, light-headedness and headaches. Psychiatric/Behavioral:  Negative for sleep disturbance. Objective:     /60   Pulse 78   Wt 148 lb 9.6 oz (67.4 kg)   SpO2 99%   BMI 26.32 kg/m²   Physical Exam  Vitals and nursing note reviewed. Constitutional:       General: She is not in acute distress. Appearance: She is well-developed. She is not ill-appearing. HENT:      Head: Normocephalic and atraumatic. Right Ear: External ear normal.      Left Ear: External ear normal.   Eyes:      General: No scleral icterus. Right eye: No discharge. Left eye: No discharge. Conjunctiva/sclera: Conjunctivae normal.   Neck:      Thyroid: No thyromegaly. Trachea: No tracheal deviation. Cardiovascular:      Rate and Rhythm: Normal rate and regular rhythm. Heart sounds: Normal heart sounds. Pulmonary:      Effort: Pulmonary effort is normal. No respiratory distress. Breath sounds: Normal breath sounds. No wheezing. Lymphadenopathy:      Cervical: No cervical adenopathy. Skin:     General: Skin is warm. Findings: No rash. Neurological:      Mental Status: She is alert and oriented to person, place, and time. Psychiatric:         Mood and Affect: Mood normal.         Behavior: Behavior normal.         Thought Content: Thought content normal.       Assessment/Plan:   1. Type 2 diabetes mellitus without complication, without long-term current use of insulin (HCC)  Assessment & Plan:   continue metformin and invokana  Orders:  -     POCT glycosylated hemoglobin (Hb A1C)  -     CBC with Auto Differential; Future  -     Comprehensive Metabolic Panel; Future  2.  Primary hypertension  Assessment & Plan: Well-controlled, continue current medications  Orders:  -     CBC with Auto Differential; Future  -     Comprehensive Metabolic Panel; Future     Return in about 3 months (around 10/18/2022) for DM check, HTN f/u. Data Unavailable     Orders Placed This Encounter   Procedures    CBC with Auto Differential     Standing Status:   Future     Standing Expiration Date:   7/19/2023    Comprehensive Metabolic Panel     Standing Status:   Future     Standing Expiration Date:   7/19/2023    POCT glycosylated hemoglobin (Hb A1C)     Orders Placed This Encounter   Medications    canagliflozin (INVOKANA) 300 MG TABS tablet     Sig: Take 1 tablet by mouth every morning (before breakfast)     Dispense:  90 tablet     Refill:  3       Patient given educational materials - see patient instructions. Discussed use, benefit, and side effects of prescribed medications. All patient questions answered. Pt voiced understanding. Reviewed health maintenance. Instructed to continue current medications, diet and exercise. Patient agreed with treatment plan. Follow up as directed.      Electronicallysigned by Ross Arriaga MD on 7/18/2022 at 2:16 PM

## 2022-07-20 DIAGNOSIS — E11.9 TYPE 2 DIABETES MELLITUS WITHOUT COMPLICATION, WITHOUT LONG-TERM CURRENT USE OF INSULIN (HCC): ICD-10-CM

## 2022-07-20 DIAGNOSIS — I10 PRIMARY HYPERTENSION: ICD-10-CM

## 2022-07-20 LAB
ABSOLUTE EOS #: 0.12 K/UL (ref 0–0.44)
ABSOLUTE IMMATURE GRANULOCYTE: <0.03 K/UL (ref 0–0.3)
ABSOLUTE LYMPH #: 1.66 K/UL (ref 1.1–3.7)
ABSOLUTE MONO #: 0.38 K/UL (ref 0.1–1.2)
ALBUMIN SERPL-MCNC: 4.5 G/DL (ref 3.5–5.2)
ALBUMIN/GLOBULIN RATIO: 1.8 (ref 1–2.5)
ALP BLD-CCNC: 82 U/L (ref 35–104)
ALT SERPL-CCNC: 19 U/L (ref 5–33)
ANION GAP SERPL CALCULATED.3IONS-SCNC: 15 MMOL/L (ref 9–17)
AST SERPL-CCNC: 19 U/L
BASOPHILS # BLD: 1 % (ref 0–2)
BASOPHILS ABSOLUTE: 0.07 K/UL (ref 0–0.2)
BILIRUB SERPL-MCNC: 0.62 MG/DL (ref 0.3–1.2)
BUN BLDV-MCNC: 45 MG/DL (ref 8–23)
CALCIUM SERPL-MCNC: 9.9 MG/DL (ref 8.6–10.4)
CHLORIDE BLD-SCNC: 100 MMOL/L (ref 98–107)
CO2: 23 MMOL/L (ref 20–31)
CREAT SERPL-MCNC: 1.21 MG/DL (ref 0.5–0.9)
EOSINOPHILS RELATIVE PERCENT: 2 % (ref 1–4)
GFR AFRICAN AMERICAN: 53 ML/MIN
GFR NON-AFRICAN AMERICAN: 44 ML/MIN
GFR SERPL CREATININE-BSD FRML MDRD: ABNORMAL ML/MIN/{1.73_M2}
GLUCOSE BLD-MCNC: 142 MG/DL (ref 70–99)
HCT VFR BLD CALC: 39.3 % (ref 36.3–47.1)
HEMOGLOBIN: 12.6 G/DL (ref 11.9–15.1)
IMMATURE GRANULOCYTES: 0 %
LYMPHOCYTES # BLD: 32 % (ref 24–43)
MCH RBC QN AUTO: 31.9 PG (ref 25.2–33.5)
MCHC RBC AUTO-ENTMCNC: 32.1 G/DL (ref 28.4–34.8)
MCV RBC AUTO: 99.5 FL (ref 82.6–102.9)
MONOCYTES # BLD: 7 % (ref 3–12)
NRBC AUTOMATED: 0 PER 100 WBC
PDW BLD-RTO: 12.8 % (ref 11.8–14.4)
PLATELET # BLD: 203 K/UL (ref 138–453)
PMV BLD AUTO: 9.7 FL (ref 8.1–13.5)
POTASSIUM SERPL-SCNC: 5 MMOL/L (ref 3.7–5.3)
RBC # BLD: 3.95 M/UL (ref 3.95–5.11)
SEG NEUTROPHILS: 58 % (ref 36–65)
SEGMENTED NEUTROPHILS ABSOLUTE COUNT: 3.03 K/UL (ref 1.5–8.1)
SODIUM BLD-SCNC: 138 MMOL/L (ref 135–144)
TOTAL PROTEIN: 7 G/DL (ref 6.4–8.3)
WBC # BLD: 5.3 K/UL (ref 3.5–11.3)

## 2022-07-27 ENCOUNTER — OFFICE VISIT (OUTPATIENT)
Dept: PRIMARY CARE CLINIC | Age: 72
End: 2022-07-27
Payer: MEDICARE

## 2022-07-27 VITALS
DIASTOLIC BLOOD PRESSURE: 62 MMHG | WEIGHT: 145.6 LBS | SYSTOLIC BLOOD PRESSURE: 120 MMHG | OXYGEN SATURATION: 99 % | BODY MASS INDEX: 25.79 KG/M2 | HEART RATE: 89 BPM

## 2022-07-27 DIAGNOSIS — N17.9 ACUTE RENAL FAILURE, UNSPECIFIED ACUTE RENAL FAILURE TYPE (HCC): ICD-10-CM

## 2022-07-27 PROCEDURE — G8417 CALC BMI ABV UP PARAM F/U: HCPCS | Performed by: FAMILY MEDICINE

## 2022-07-27 PROCEDURE — 1036F TOBACCO NON-USER: CPT | Performed by: FAMILY MEDICINE

## 2022-07-27 PROCEDURE — 1090F PRES/ABSN URINE INCON ASSESS: CPT | Performed by: FAMILY MEDICINE

## 2022-07-27 PROCEDURE — 99213 OFFICE O/P EST LOW 20 MIN: CPT | Performed by: FAMILY MEDICINE

## 2022-07-27 PROCEDURE — 3017F COLORECTAL CA SCREEN DOC REV: CPT | Performed by: FAMILY MEDICINE

## 2022-07-27 PROCEDURE — 1123F ACP DISCUSS/DSCN MKR DOCD: CPT | Performed by: FAMILY MEDICINE

## 2022-07-27 PROCEDURE — G8427 DOCREV CUR MEDS BY ELIG CLIN: HCPCS | Performed by: FAMILY MEDICINE

## 2022-07-27 PROCEDURE — G8399 PT W/DXA RESULTS DOCUMENT: HCPCS | Performed by: FAMILY MEDICINE

## 2022-07-27 ASSESSMENT — ENCOUNTER SYMPTOMS
EYE DISCHARGE: 0
DIARRHEA: 0
RHINORRHEA: 0
COUGH: 0
SORE THROAT: 0
ABDOMINAL PAIN: 0
VOMITING: 0
EYE REDNESS: 0
SHORTNESS OF BREATH: 0
WHEEZING: 0
NAUSEA: 0

## 2022-07-27 NOTE — ASSESSMENT & PLAN NOTE
d/c NSAIDs, recheck and if still low GFR will d/c invokana, metformin, and HCTZ and recheck in 1 month. also will need renal u/s and referal to nephro if not better.

## 2022-07-27 NOTE — PROGRESS NOTES
[address] [facility] PRIMARY CARE  [address] [facility] [address]  Dept: [address] Marie Vanessa is a 74 y.o. female Established patient, who presents today for her medical conditions/complaints as noted below. Chief Complaint   Patient presents with    Results     Lab results        HPI:     HPI- pt not taking meloxicam- kidney function was a bit worse.       Reviewed prior notes: None   Reviewed previous:  Labs    LDL Cholesterol (mg/dL)   Date Value   12/11/2018 85     LDL Calculated (mg/dL)   Date Value   11/20/2017 78   12/15/2016 91       (goal LDL is <100)   AST (U/L)   Date Value   07/20/2022 19     ALT (U/L)   Date Value   07/20/2022 19     BUN (mg/dL)   Date Value   07/20/2022 45 (H)     Hemoglobin A1C (%)   Date Value   07/18/2022 6.9     BP Readings from Last 3 Encounters:   07/27/22 120/62   07/18/22 110/60   04/08/22 122/70          (goal 120/80)    Past Medical History:   Diagnosis Date    Colon polyp     Diabetes mellitus ([facility] [address]     Diarrhea     Gallstones     H/O neoplasm of uncertain behavior of skin     Hearing aid worn     B/l    Hearing loss, bilateral     Hyperglycemia 11/08/2015    Hypertension     Stye     right eye    Wears glasses       Past Surgical History:   Procedure Laterality Date    BREAST BIOPSY Right     CARDIAC CATHETERIZATION  05/07/2014    CHOLECYSTECTOMY, LAPAROSCOPIC N/A 2/2/2021    CHOLECYSTECTOMY LAPAROSCOPIC ROBOTIC XI performed by Wicho Smyth MD at [address] [place] [address],[address]      COLONOSCOPY N/A 2/1/2021    COLONOSCOPY WITH BIOPSY performed by Wicho Smyth MD at [facility]      WISDOM TOOTH EXTRACTION         Family History   Problem Relation Age of Onset    Diabetes Mother     High Blood Pressure Mother     Heart Disease Father     High Blood Pressure Father     Parkinsonism Father     Cancer Brother         melanoma- metastaic    Hypertension Brother     Other Brother         neuro degenerative disease    Mult Sclerosis Nephew        Social History     Tobacco Use    Smoking status: Never    Smokeless tobacco: Never   Substance Use Topics    Alcohol use: Yes     Comment: rare      Current Outpatient Medications   Medication Sig Dispense Refill    canagliflozin (INVOKANA) 300 MG TABS tablet Take 1 tablet by mouth every morning (before breakfast) 90 tablet 3    metFORMIN (GLUCOPHAGE-XR) 500 MG extended release tablet Take 2 tablets by mouth daily (with breakfast) 180 tablet 3    blood glucose test strips (TRUE METRIX BLOOD GLUCOSE TEST) strip 1 each by Miscell. (Med.Supl.;Non-Drugs) route daily 100 each 3    atorvastatin (LIPITOR) 20 MG tablet Take 1 tablet by mouth daily 90 tablet 3    lisinopril-hydroCHLOROthiazide (PRINZIDE;ZESTORETIC) 20-25 MG per tablet TAKE ONE TABLET BY MOUTH DAILY 90 tablet 3    cholestyramine (QUESTRAN) 4 g packet Take 1 packet by mouth 3 times daily 90 packet 3    Probiotic Product (PROBIOTIC DAILY PO) Take by mouth daily       aspirin 81 MG EC tablet Take 81 mg by mouth daily. Flaxseed, Linseed, 1000 MG CAPS Take 1 tablet by mouth. Multiple Vitamins-Minerals (THERAPEUTIC MULTIVITAMIN-MINERALS) tablet Take 1 tablet by mouth daily. No current facility-administered medications for this visit.      No Known Allergies    Health Maintenance   Topic Date Due    DTaP/Tdap/Td vaccine (1 - Tdap) Never done    Diabetic foot exam  06/04/2019    Lipids  12/11/2019    Annual Wellness Visit (AWV)  09/23/2020    COVID-19 Vaccine (4 - Booster for Pfizer series) 02/20/2022    Diabetic retinal exam  08/17/2022    Flu vaccine (1) 09/01/2022    Diabetic microalbuminuria test  10/01/2022    Depression Screen  01/06/2023    Breast cancer screen  07/09/2023    A1C test (Diabetic or Prediabetic)  07/18/2023    Colorectal Cancer Screen  02/01/2031    DEXA (modify frequency per FRAX score)  Completed    Shingles vaccine  Completed Pneumococcal 65+ years Vaccine  Completed    Hepatitis C screen  Completed    Hepatitis A vaccine  Aged Out    Hib vaccine  Aged Out    Meningococcal (ACWY) vaccine  Aged Out       Subjective:      Review of Systems   Constitutional:  Negative for chills and fever. HENT:  Negative for rhinorrhea and sore throat. Eyes:  Negative for discharge and redness. Respiratory:  Negative for cough, shortness of breath and wheezing. Cardiovascular:  Negative for chest pain and palpitations. Gastrointestinal:  Negative for abdominal pain, diarrhea, nausea and vomiting. Genitourinary:  Negative for dysuria and frequency. Musculoskeletal:  Negative for arthralgias and myalgias. Neurological:  Negative for dizziness, light-headedness and headaches. Psychiatric/Behavioral:  Negative for sleep disturbance. Objective:     /62   Pulse 89   Wt 145 lb 9.6 oz (66 kg)   SpO2 99%   BMI 25.79 kg/m²   Physical Exam  Vitals and nursing note reviewed. Constitutional:       Appearance: Normal appearance. HENT:      Head: Normocephalic and atraumatic. Eyes:      General: No scleral icterus. Right eye: No discharge. Conjunctiva/sclera: Conjunctivae normal.   Pulmonary:      Effort: Pulmonary effort is normal. No respiratory distress. Skin:     Coloration: Skin is not jaundiced or pale. Neurological:      General: No focal deficit present. Mental Status: She is alert. Psychiatric:         Mood and Affect: Mood normal.         Behavior: Behavior normal.         Thought Content: Thought content normal.       Assessment/Plan:   1. Acute renal failure, unspecified acute renal failure type [facility])  Assessment & Plan:   d/c NSAIDs, recheck and if still low GFR will d/c invokana, metformin, and HCTZ and recheck in 1 month. also will need renal u/s and referal to nephro if not better. Orders:  -     Basic Metabolic Panel; Future     No follow-ups on file.   Keep current appt     Orders Placed This Encounter   Procedures    Basic Metabolic Panel     Standing Status:   Future     Standing Expiration Date:   7/28/2023       No orders of the defined types were placed in this encounter. Patient given educational materials - see patient instructions. Discussed use, benefit, and side effects of prescribed medications. All patient questions answered. Pt voiced understanding. Reviewed health maintenance. Instructed to continue current medications, diet and exercise. Patient agreed with treatment plan. Follow up as directed.      Electronically signed by Jaswinder Puentes MD on 7/27/2022 at 11:13 AM

## 2022-08-16 DIAGNOSIS — N17.9 ACUTE RENAL FAILURE, UNSPECIFIED ACUTE RENAL FAILURE TYPE (HCC): ICD-10-CM

## 2022-08-16 LAB
ANION GAP SERPL CALCULATED.3IONS-SCNC: 14 MMOL/L (ref 9–17)
BUN BLDV-MCNC: 38 MG/DL (ref 8–23)
CALCIUM SERPL-MCNC: 9.1 MG/DL (ref 8.6–10.4)
CHLORIDE BLD-SCNC: 103 MMOL/L (ref 98–107)
CO2: 21 MMOL/L (ref 20–31)
CREAT SERPL-MCNC: 1.1 MG/DL (ref 0.5–0.9)
GFR AFRICAN AMERICAN: 59 ML/MIN
GFR NON-AFRICAN AMERICAN: 49 ML/MIN
GFR SERPL CREATININE-BSD FRML MDRD: ABNORMAL ML/MIN/{1.73_M2}
GLUCOSE BLD-MCNC: 103 MG/DL (ref 70–99)
POTASSIUM SERPL-SCNC: 3.4 MMOL/L (ref 3.7–5.3)
SODIUM BLD-SCNC: 138 MMOL/L (ref 135–144)

## 2022-08-24 RX ORDER — CHOLESTYRAMINE 4 G/9G
1 POWDER, FOR SUSPENSION ORAL 3 TIMES DAILY
Qty: 90 PACKET | Refills: 3 | Status: SHIPPED | OUTPATIENT
Start: 2022-08-24 | End: 2022-08-31 | Stop reason: SDUPTHER

## 2022-08-29 RX ORDER — ATORVASTATIN CALCIUM 20 MG/1
20 TABLET, FILM COATED ORAL DAILY
Qty: 90 TABLET | Refills: 3 | Status: SHIPPED | OUTPATIENT
Start: 2022-08-29

## 2022-09-01 RX ORDER — CHOLESTYRAMINE 4 G/9G
1 POWDER, FOR SUSPENSION ORAL 2 TIMES DAILY
Qty: 60 PACKET | Refills: 3 | Status: SHIPPED | OUTPATIENT
Start: 2022-09-01

## 2022-10-11 SDOH — HEALTH STABILITY: PHYSICAL HEALTH: ON AVERAGE, HOW MANY MINUTES DO YOU ENGAGE IN EXERCISE AT THIS LEVEL?: 30 MIN

## 2022-10-11 SDOH — HEALTH STABILITY: PHYSICAL HEALTH: ON AVERAGE, HOW MANY DAYS PER WEEK DO YOU ENGAGE IN MODERATE TO STRENUOUS EXERCISE (LIKE A BRISK WALK)?: 2 DAYS

## 2022-10-11 ASSESSMENT — LIFESTYLE VARIABLES
HOW OFTEN DO YOU HAVE A DRINK CONTAINING ALCOHOL: 3
HOW OFTEN DO YOU HAVE SIX OR MORE DRINKS ON ONE OCCASION: 1
HOW MANY STANDARD DRINKS CONTAINING ALCOHOL DO YOU HAVE ON A TYPICAL DAY: 1 OR 2
HOW OFTEN DO YOU HAVE A DRINK CONTAINING ALCOHOL: 2-4 TIMES A MONTH
HOW MANY STANDARD DRINKS CONTAINING ALCOHOL DO YOU HAVE ON A TYPICAL DAY: 1

## 2022-10-11 ASSESSMENT — PATIENT HEALTH QUESTIONNAIRE - PHQ9
SUM OF ALL RESPONSES TO PHQ QUESTIONS 1-9: 0
1. LITTLE INTEREST OR PLEASURE IN DOING THINGS: 0
2. FEELING DOWN, DEPRESSED OR HOPELESS: 0
SUM OF ALL RESPONSES TO PHQ QUESTIONS 1-9: 0
SUM OF ALL RESPONSES TO PHQ QUESTIONS 1-9: 0
SUM OF ALL RESPONSES TO PHQ9 QUESTIONS 1 & 2: 0
SUM OF ALL RESPONSES TO PHQ QUESTIONS 1-9: 0

## 2022-10-24 ENCOUNTER — OFFICE VISIT (OUTPATIENT)
Dept: PRIMARY CARE CLINIC | Age: 72
End: 2022-10-24
Payer: MEDICARE

## 2022-10-24 VITALS
OXYGEN SATURATION: 95 % | DIASTOLIC BLOOD PRESSURE: 68 MMHG | HEIGHT: 62 IN | BODY MASS INDEX: 27.34 KG/M2 | WEIGHT: 148.6 LBS | HEART RATE: 68 BPM | SYSTOLIC BLOOD PRESSURE: 120 MMHG

## 2022-10-24 DIAGNOSIS — Z00.00 MEDICARE ANNUAL WELLNESS VISIT, SUBSEQUENT: Primary | ICD-10-CM

## 2022-10-24 DIAGNOSIS — Z12.31 ENCOUNTER FOR SCREENING MAMMOGRAM FOR MALIGNANT NEOPLASM OF BREAST: ICD-10-CM

## 2022-10-24 DIAGNOSIS — E11.9 TYPE 2 DIABETES MELLITUS WITHOUT COMPLICATION, WITHOUT LONG-TERM CURRENT USE OF INSULIN (HCC): ICD-10-CM

## 2022-10-24 DIAGNOSIS — I10 PRIMARY HYPERTENSION: ICD-10-CM

## 2022-10-24 LAB
CREATININE URINE POCT: 200
HBA1C MFR BLD: 6.5 %
MICROALBUMIN/CREAT 24H UR: 10 MG/G{CREAT}
MICROALBUMIN/CREAT UR-RTO: <30

## 2022-10-24 PROCEDURE — 1123F ACP DISCUSS/DSCN MKR DOCD: CPT | Performed by: FAMILY MEDICINE

## 2022-10-24 PROCEDURE — 3044F HG A1C LEVEL LT 7.0%: CPT | Performed by: FAMILY MEDICINE

## 2022-10-24 PROCEDURE — G0439 PPPS, SUBSEQ VISIT: HCPCS | Performed by: FAMILY MEDICINE

## 2022-10-24 PROCEDURE — 82044 UR ALBUMIN SEMIQUANTITATIVE: CPT | Performed by: FAMILY MEDICINE

## 2022-10-24 PROCEDURE — G8484 FLU IMMUNIZE NO ADMIN: HCPCS | Performed by: FAMILY MEDICINE

## 2022-10-24 PROCEDURE — 3017F COLORECTAL CA SCREEN DOC REV: CPT | Performed by: FAMILY MEDICINE

## 2022-10-24 PROCEDURE — 83036 HEMOGLOBIN GLYCOSYLATED A1C: CPT | Performed by: FAMILY MEDICINE

## 2022-10-24 ASSESSMENT — PATIENT HEALTH QUESTIONNAIRE - PHQ9
SUM OF ALL RESPONSES TO PHQ QUESTIONS 1-9: 0
SUM OF ALL RESPONSES TO PHQ9 QUESTIONS 1 & 2: 0
SUM OF ALL RESPONSES TO PHQ QUESTIONS 1-9: 0
1. LITTLE INTEREST OR PLEASURE IN DOING THINGS: 0
SUM OF ALL RESPONSES TO PHQ QUESTIONS 1-9: 0
2. FEELING DOWN, DEPRESSED OR HOPELESS: 0
SUM OF ALL RESPONSES TO PHQ QUESTIONS 1-9: 0

## 2022-10-24 ASSESSMENT — LIFESTYLE VARIABLES
HOW OFTEN DO YOU HAVE A DRINK CONTAINING ALCOHOL: 2-4 TIMES A MONTH
HOW MANY STANDARD DRINKS CONTAINING ALCOHOL DO YOU HAVE ON A TYPICAL DAY: 1 OR 2

## 2022-10-24 NOTE — PROGRESS NOTES
Medicare Annual Wellness Visit    Teresa Barrios is here for Hypertension (Patient doesn't check B/P at home ), Diabetes (Patient checks blood sugar at times ), and Medicare AWV (Patient was given the words respect elephant and green to remember. She was given the time of 4:15 to draw )    Assessment & Plan   Medicare annual wellness visit, subsequent  Type 2 diabetes mellitus without complication, without long-term current use of insulin (HCC)  -     POCT glycosylated hemoglobin (Hb A1C)  -     POCT microalbumin  Encounter for screening mammogram for malignant neoplasm of breast  -     GERRY DIGITAL SCREEN W OR WO CAD BILATERAL; Future  Primary hypertension  Assessment & Plan:   Well-controlled, continue current medications    Recommendations for Preventive Services Due: see orders and patient instructions/AVS.  Recommended screening schedule for the next 5-10 years is provided to the patient in written form: see Patient Instructions/AVS.     Return in about 1 year (around 10/24/2023) for [address] Subjective   The following acute and/or chronic problems were also addressed today:  DM- sugars are controlled, but invokana is causing diarrhea and ? GFR decrease as well as it is very expensive. Is now in the donut hole. Checking sugars occas. 120-150 in the morning. HbA1c better with the invokana. Patient's complete Health Risk Assessment and screening values have been reviewed and are found in Flowsheets. The following problems were reviewed today and where indicated follow up appointments were made and/or referrals ordered.     Positive Risk Factor Screenings with Interventions:             General Health and ACP:  General  In general, how would you say your health is?: Very Good  In the past 7 days, have you experienced any of the following: New or Increased Pain, New or Increased Fatigue, Loneliness, Social Isolation, Stress or Anger?: No  Do you get the social and emotional support that you need?: Yes  Do you have a Living Will?: (!) No    Advance Directives       Power of [address] Will ACP-Advance Directive ACP-Power of     Not on File Not on File Not on File Not on File        General Health Risk Interventions:  Does not have POA- HC- niece would be decision maker. Health Habits/Nutrition:  Physical Activity: Inactive    Days of Exercise per Week: 0 days    Minutes of Exercise per Session: 0 min     Have you lost any weight without trying in the past 3 months?: No  Body mass index: (!) 27.34  Have you seen the dentist within the past year?: Yes  Health Habits/Nutrition Interventions:  Encouraged to get more exercise. Hearing/Vision:  Do you or your family notice any trouble with your hearing that hasn't been managed with hearing aids?: (!) Yes  Do you have difficulty driving, watching TV, or doing any of your daily activities because of your eyesight?: No  Have you had an eye exam within the past year?: Yes  No results found. Hearing/Vision Interventions:  Has hearing aides. Objective   Vitals:    10/24/22 1348   BP: 120/68   Pulse: 68   SpO2: 95%   Weight: 148 lb 9.6 oz (67.4 kg)   Height: 5' 1.81\" (1.57 m)      Body mass index is 27.35 kg/m². Physical Exam  Vitals and nursing note reviewed. Constitutional:       General: She is not in acute distress. Appearance: She is well-developed. She is not ill-appearing. HENT:      Head: Normocephalic and atraumatic. Right Ear: External ear normal.      Left Ear: External ear normal.   Eyes:      General: No scleral icterus. Right eye: No discharge. Left eye: No discharge. Conjunctiva/sclera: Conjunctivae normal.   Neck:      Thyroid: No thyromegaly. Trachea: No tracheal deviation. Cardiovascular:      Rate and Rhythm: Normal rate and regular rhythm. Heart sounds: Normal heart sounds. Pulmonary:      Effort: Pulmonary effort is normal. No respiratory distress.       Breath sounds: Normal breath sounds. No wheezing. Lymphadenopathy:      Cervical: No cervical adenopathy. Skin:     General: Skin is warm. Findings: No rash. Neurological:      Mental Status: She is alert and oriented to person, place, and time. Psychiatric:         Mood and Affect: Mood normal.         Behavior: Behavior normal.         Thought Content: Thought content normal.           No Known Allergies  Prior to Visit Medications    Medication Sig Taking? Authorizing Provider   cholestyramine (QUESTRAN) 4 g packet Take 1 packet by mouth 2 times daily Yes Jigna Pak MD   atorvastatin (LIPITOR) 20 MG tablet Take 1 tablet by mouth daily Yes Jigna Pak MD   canagliflozin (INVOKANA) 300 MG TABS tablet Take 1 tablet by mouth every morning (before breakfast) Yes Jigna Pak MD   metFORMIN (GLUCOPHAGE-XR) 500 MG extended release tablet Take 2 tablets by mouth daily (with breakfast) Yes Jigna Pak MD   blood glucose test strips (TRUE METRIX BLOOD GLUCOSE TEST) strip 1 each by Miscell. (Med.Supl.;Non-Drugs) route daily Yes Jigna Pak MD   lisinopril-hydroCHLOROthiazide (PRINZIDE;ZESTORETIC) 20-25 MG per tablet TAKE ONE TABLET BY MOUTH DAILY Yes Jigna Pak MD   Probiotic Product (PROBIOTIC DAILY PO) Take by mouth daily  Yes Historical Provider, MD   aspirin 81 MG EC tablet Take 81 mg by mouth daily. Yes Historical Provider, MD   Flaxseed, Linseed, 1000 MG CAPS Take 1 tablet by mouth. Yes Historical Provider, MD   Multiple Vitamins-Minerals (THERAPEUTIC MULTIVITAMIN-MINERALS) tablet Take 1 tablet by mouth daily.  Yes Historical Provider, MD Peterson (Including outside providers/suppliers regularly involved in providing care):   Patient Care Team:  Jigna Pak MD as PCP - General  Jigna Pak MD as PCP - [facility] Empaneled Provider     Reviewed and updated this visit:  Tobacco  Allergies  Meds  Med Hx  Surg Hx  Soc Hx  Fam Hx

## 2022-10-26 ENCOUNTER — HOSPITAL ENCOUNTER (OUTPATIENT)
Dept: WOMENS IMAGING | Age: 72
Discharge: HOME | End: 2022-10-28
Payer: MEDICARE

## 2022-10-26 DIAGNOSIS — Z12.31 ENCOUNTER FOR SCREENING MAMMOGRAM FOR MALIGNANT NEOPLASM OF BREAST: ICD-10-CM

## 2022-10-26 PROCEDURE — 77063 BREAST TOMOSYNTHESIS BI: CPT

## 2022-11-04 DIAGNOSIS — I10 ESSENTIAL HYPERTENSION: ICD-10-CM

## 2022-11-07 RX ORDER — LISINOPRIL AND HYDROCHLOROTHIAZIDE 25; 20 MG/1; MG/1
TABLET ORAL
Qty: 90 TABLET | Refills: 2 | Status: SHIPPED | OUTPATIENT
Start: 2022-11-07

## 2022-12-30 DIAGNOSIS — E11.9 TYPE 2 DIABETES MELLITUS WITHOUT COMPLICATION, WITHOUT LONG-TERM CURRENT USE OF INSULIN (HCC): ICD-10-CM

## 2022-12-30 LAB
ANION GAP SERPL CALCULATED.3IONS-SCNC: 13 MMOL/L (ref 9–17)
BUN BLDV-MCNC: 35 MG/DL (ref 8–23)
CALCIUM SERPL-MCNC: 9.9 MG/DL (ref 8.6–10.4)
CHLORIDE BLD-SCNC: 98 MMOL/L (ref 98–107)
CO2: 28 MMOL/L (ref 20–31)
CREAT SERPL-MCNC: 0.97 MG/DL (ref 0.5–0.9)
GFR SERPL CREATININE-BSD FRML MDRD: >60 ML/MIN/1.73M2
GLUCOSE BLD-MCNC: 126 MG/DL (ref 70–99)
POTASSIUM SERPL-SCNC: 4 MMOL/L (ref 3.7–5.3)
SODIUM BLD-SCNC: 139 MMOL/L (ref 135–144)

## 2023-01-18 DIAGNOSIS — I10 ESSENTIAL HYPERTENSION: ICD-10-CM

## 2023-01-19 RX ORDER — LISINOPRIL AND HYDROCHLOROTHIAZIDE 25; 20 MG/1; MG/1
TABLET ORAL
Qty: 90 TABLET | Refills: 3 | Status: SHIPPED | OUTPATIENT
Start: 2023-01-19

## 2023-01-19 RX ORDER — METFORMIN HYDROCHLORIDE 500 MG/1
1000 TABLET, EXTENDED RELEASE ORAL
Qty: 180 TABLET | Refills: 3 | Status: SHIPPED | OUTPATIENT
Start: 2023-01-19

## 2023-01-19 RX ORDER — ATORVASTATIN CALCIUM 20 MG/1
20 TABLET, FILM COATED ORAL DAILY
Qty: 90 TABLET | Refills: 3 | Status: SHIPPED | OUTPATIENT
Start: 2023-01-19

## 2023-02-08 ENCOUNTER — OFFICE VISIT (OUTPATIENT)
Dept: PRIMARY CARE CLINIC | Age: 73
End: 2023-02-08

## 2023-02-08 VITALS
OXYGEN SATURATION: 98 % | WEIGHT: 148.6 LBS | HEART RATE: 73 BPM | DIASTOLIC BLOOD PRESSURE: 68 MMHG | BODY MASS INDEX: 27.35 KG/M2 | SYSTOLIC BLOOD PRESSURE: 120 MMHG

## 2023-02-08 DIAGNOSIS — I10 PRIMARY HYPERTENSION: ICD-10-CM

## 2023-02-08 DIAGNOSIS — E11.9 TYPE 2 DIABETES MELLITUS WITHOUT COMPLICATION, WITHOUT LONG-TERM CURRENT USE OF INSULIN (HCC): Primary | ICD-10-CM

## 2023-02-08 LAB — HBA1C MFR BLD: 6.4 %

## 2023-02-08 SDOH — ECONOMIC STABILITY: FOOD INSECURITY: WITHIN THE PAST 12 MONTHS, YOU WORRIED THAT YOUR FOOD WOULD RUN OUT BEFORE YOU GOT MONEY TO BUY MORE.: NEVER TRUE

## 2023-02-08 SDOH — ECONOMIC STABILITY: INCOME INSECURITY: HOW HARD IS IT FOR YOU TO PAY FOR THE VERY BASICS LIKE FOOD, HOUSING, MEDICAL CARE, AND HEATING?: NOT HARD AT ALL

## 2023-02-08 SDOH — ECONOMIC STABILITY: FOOD INSECURITY: WITHIN THE PAST 12 MONTHS, THE FOOD YOU BOUGHT JUST DIDN'T LAST AND YOU DIDN'T HAVE MONEY TO GET MORE.: NEVER TRUE

## 2023-02-08 SDOH — ECONOMIC STABILITY: HOUSING INSECURITY
IN THE LAST 12 MONTHS, WAS THERE A TIME WHEN YOU DID NOT HAVE A STEADY PLACE TO SLEEP OR SLEPT IN A SHELTER (INCLUDING NOW)?: NO

## 2023-02-08 ASSESSMENT — PATIENT HEALTH QUESTIONNAIRE - PHQ9
1. LITTLE INTEREST OR PLEASURE IN DOING THINGS: 0
SUM OF ALL RESPONSES TO PHQ9 QUESTIONS 1 & 2: 0
2. FEELING DOWN, DEPRESSED OR HOPELESS: 0
SUM OF ALL RESPONSES TO PHQ QUESTIONS 1-9: 0

## 2023-02-08 ASSESSMENT — ENCOUNTER SYMPTOMS
ABDOMINAL PAIN: 0
NAUSEA: 0
WHEEZING: 0
SORE THROAT: 0
COUGH: 0
DIARRHEA: 1
VOMITING: 0
EYE REDNESS: 0
EYE DISCHARGE: 0
SHORTNESS OF BREATH: 0
RHINORRHEA: 0

## 2023-02-08 NOTE — PROGRESS NOTES
717 [facility] PRIMARY CARE  [address] [place] [address]  Dept: [address] Diamante Sloan is a 74 y.o. female Established patient, who presents today for her medical conditions/complaints as noted below  Chief Complaint   Patient presents with    Hypertension     Patient doesn't check B/P at home    Diabetes     Patient doesn't check blood sugar at home        HPI:     HPI  Pt doing okay. Would like to get off the invokana because of the cost.  $160 per month.    Reviewed prior notes:  None  Reviewed previous:  Labs    Hemoglobin A1C (%)   Date Value   02/08/2023 6.4   10/24/2022 6.5   07/18/2022 6.9             ( goal A1C is < 7)   No results found for: LABMICR  LDL Cholesterol (mg/dL)   Date Value   12/11/2018 85     LDL Calculated (mg/dL)   Date Value   11/20/2017 78   12/15/2016 91       (goal LDL is <100)   AST (U/L)   Date Value   07/20/2022 19     ALT (U/L)   Date Value   07/20/2022 19     BUN (mg/dL)   Date Value   12/30/2022 35 (H)     BP Readings from Last 3 Encounters:   02/08/23 120/68   10/24/22 120/68   07/27/22 120/62          (goal 140/90)    Past Medical History:   Diagnosis Date    Colon polyp     Diabetes mellitus ([address]     Diarrhea     Gallstones     H/O neoplasm of uncertain behavior of skin     Hearing aid worn     B/l    Hearing loss, bilateral     Hyperglycemia 11/08/2015    Hypertension     Stye     right eye    Wears glasses         Social History     Tobacco Use    Smoking status: Never    Smokeless tobacco: Never   Substance Use Topics    Alcohol use: Yes     Comment: rare- 2-3 times per month at the most      Current Outpatient Medications   Medication Sig Dispense Refill    lisinopril-hydroCHLOROthiazide (PRINZIDE;ZESTORETIC) 20-25 MG per tablet Take 1 tablet by mouth daily 90 tablet 3    atorvastatin (LIPITOR) 20 MG tablet Take 1 tablet by mouth daily 90 tablet 3    metFORMIN (GLUCOPHAGE-XR) 500 MG extended release tablet Take 2 tablets by mouth daily (with breakfast) 180 tablet 3    cholestyramine (QUESTRAN) 4 g packet Take 1 packet by mouth 2 times daily 60 packet 3    blood glucose test strips (TRUE METRIX BLOOD GLUCOSE TEST) strip 1 each by Miscell. (Med.Supl.;Non-Drugs) route daily 100 each 3    Probiotic Product (PROBIOTIC DAILY PO) Take by mouth daily       aspirin 81 MG EC tablet Take 81 mg by mouth daily. Flaxseed, Linseed, 1000 MG CAPS Take 1 tablet by mouth. Multiple Vitamins-Minerals (THERAPEUTIC MULTIVITAMIN-MINERALS) tablet Take 1 tablet by mouth daily. No current facility-administered medications for this visit. No Known Allergies    Health Maintenance   Topic Date Due    Diabetic foot exam  06/04/2019    Lipids  12/11/2019    Diabetic retinal exam  08/19/2023    Diabetic Alb to Cr ratio (uACR) test  10/24/2023    Depression Screen  10/24/2023    Annual Wellness Visit (AWV)  10/25/2023    GFR test (Diabetes, CKD 3-4, OR last GFR 15-59)  12/30/2023    A1C test (Diabetic or Prediabetic)  02/08/2024    Breast cancer screen  10/26/2024    Colorectal Cancer Screen  02/01/2031    DTaP/Tdap/Td vaccine (2 - Td or Tdap) 01/26/2033    DEXA (modify frequency per FRAX score)  Completed    Flu vaccine  Completed    Shingles vaccine  Completed    Pneumococcal 65+ years Vaccine  Completed    COVID-19 Vaccine  Completed    Hepatitis C screen  Completed    Hepatitis A vaccine  Aged Out    Hib vaccine  Aged Out    Meningococcal (ACWY) vaccine  Aged Out       Subjective:     Review of Systems   Constitutional:  Negative for chills and fever. HENT:  Negative for rhinorrhea and sore throat. Eyes:  Negative for discharge and redness. Respiratory:  Negative for cough, shortness of breath and wheezing. Cardiovascular:  Negative for chest pain and palpitations. Gastrointestinal:  Positive for diarrhea. Negative for abdominal pain, nausea and vomiting. Genitourinary:  Negative for dysuria and frequency. Musculoskeletal:  Negative for arthralgias and myalgias. Neurological:  Negative for dizziness, light-headedness and headaches. Psychiatric/Behavioral:  Negative for sleep disturbance. Objective:     /68   Pulse 73   Wt 148 lb 9.6 oz (67.4 kg)   SpO2 98%   BMI 27.35 kg/m²   Physical Exam  Vitals and nursing note reviewed. Constitutional:       General: She is not in acute distress. Appearance: She is well-developed. She is not ill-appearing. HENT:      Head: Normocephalic and atraumatic. Right Ear: External ear normal.      Left Ear: External ear normal.   Eyes:      General: No scleral icterus. Right eye: No discharge. Left eye: No discharge. Conjunctiva/sclera: Conjunctivae normal.   Neck:      Thyroid: No thyromegaly. Trachea: No tracheal deviation. Cardiovascular:      Rate and Rhythm: Normal rate and regular rhythm. Heart sounds: Normal heart sounds. Pulmonary:      Effort: Pulmonary effort is normal. No respiratory distress. Breath sounds: Normal breath sounds. No wheezing. Lymphadenopathy:      Cervical: No cervical adenopathy. Skin:     General: Skin is warm. Findings: No rash. Neurological:      Mental Status: She is alert and oriented to person, place, and time. Psychiatric:         Mood and Affect: Mood normal.         Behavior: Behavior normal.         Thought Content: Thought content normal.       Assessment/Plan:   1. Type 2 diabetes mellitus without complication, without long-term current use of insulin (HCC)  Assessment & Plan:   pt would like to stop the invokana- due to cost.    Orders:  -     POCT glycosylated hemoglobin (Hb A1C)  2. Primary hypertension  Assessment & Plan:   Well-controlled, continue current medications     Return in about 3 months (around 5/8/2023) for DM check.   Data Unavailable     Orders Placed This Encounter   Procedures    POCT glycosylated hemoglobin (Hb A1C)     No orders of the defined types were placed in this encounter. Patient given educational materials - see patient instructions. Discussed use, benefit, and side effects of prescribed medications. All patient questions answered. Pt voiced understanding. Reviewed health maintenance. Instructed to continue current medications, diet and exercise. Patient agreed with treatment plan. Follow up as directed.      Electronicallysigned by Omer Beal MD on 2/8/2023 at 11:49 AM

## 2023-05-11 ENCOUNTER — OFFICE VISIT (OUTPATIENT)
Dept: PRIMARY CARE CLINIC | Age: 73
End: 2023-05-11
Payer: MEDICARE

## 2023-05-11 VITALS
WEIGHT: 150.8 LBS | SYSTOLIC BLOOD PRESSURE: 116 MMHG | OXYGEN SATURATION: 98 % | HEART RATE: 78 BPM | HEIGHT: 62 IN | DIASTOLIC BLOOD PRESSURE: 70 MMHG | BODY MASS INDEX: 27.75 KG/M2

## 2023-05-11 DIAGNOSIS — I10 PRIMARY HYPERTENSION: ICD-10-CM

## 2023-05-11 DIAGNOSIS — E11.9 TYPE 2 DIABETES MELLITUS WITHOUT COMPLICATION, WITHOUT LONG-TERM CURRENT USE OF INSULIN (HCC): Primary | ICD-10-CM

## 2023-05-11 DIAGNOSIS — E11.9 TYPE 2 DIABETES MELLITUS WITHOUT COMPLICATION, WITHOUT LONG-TERM CURRENT USE OF INSULIN (HCC): ICD-10-CM

## 2023-05-11 LAB
ABSOLUTE EOS #: 0.21 K/UL (ref 0–0.44)
ABSOLUTE IMMATURE GRANULOCYTE: <0.03 K/UL (ref 0–0.3)
ABSOLUTE LYMPH #: 1.35 K/UL (ref 1.1–3.7)
ABSOLUTE MONO #: 0.38 K/UL (ref 0.1–1.2)
BASOPHILS # BLD: 1 % (ref 0–2)
BASOPHILS ABSOLUTE: 0.06 K/UL (ref 0–0.2)
EOSINOPHILS RELATIVE PERCENT: 4 % (ref 1–4)
HCT VFR BLD CALC: 39.5 % (ref 36.3–47.1)
HEMOGLOBIN: 13.1 G/DL (ref 11.9–15.1)
IMMATURE GRANULOCYTES: 0 %
LYMPHOCYTES # BLD: 29 % (ref 24–43)
MCH RBC QN AUTO: 31 PG (ref 25.2–33.5)
MCHC RBC AUTO-ENTMCNC: 33.2 G/DL (ref 28.4–34.8)
MCV RBC AUTO: 93.6 FL (ref 82.6–102.9)
MONOCYTES # BLD: 8 % (ref 3–12)
NRBC AUTOMATED: 0 PER 100 WBC
PDW BLD-RTO: 12 % (ref 11.8–14.4)
PLATELET # BLD: 220 K/UL (ref 138–453)
PMV BLD AUTO: 10 FL (ref 8.1–13.5)
RBC # BLD: 4.22 M/UL (ref 3.95–5.11)
SEG NEUTROPHILS: 58 % (ref 36–65)
SEGMENTED NEUTROPHILS ABSOLUTE COUNT: 2.73 K/UL (ref 1.5–8.1)
WBC # BLD: 4.7 K/UL (ref 3.5–11.3)

## 2023-05-11 PROCEDURE — G8427 DOCREV CUR MEDS BY ELIG CLIN: HCPCS | Performed by: FAMILY MEDICINE

## 2023-05-11 PROCEDURE — 3074F SYST BP LT 130 MM HG: CPT | Performed by: FAMILY MEDICINE

## 2023-05-11 PROCEDURE — 3044F HG A1C LEVEL LT 7.0%: CPT | Performed by: FAMILY MEDICINE

## 2023-05-11 PROCEDURE — G8417 CALC BMI ABV UP PARAM F/U: HCPCS | Performed by: FAMILY MEDICINE

## 2023-05-11 PROCEDURE — 3078F DIAST BP <80 MM HG: CPT | Performed by: FAMILY MEDICINE

## 2023-05-11 PROCEDURE — G8399 PT W/DXA RESULTS DOCUMENT: HCPCS | Performed by: FAMILY MEDICINE

## 2023-05-11 PROCEDURE — 2022F DILAT RTA XM EVC RTNOPTHY: CPT | Performed by: FAMILY MEDICINE

## 2023-05-11 PROCEDURE — 99213 OFFICE O/P EST LOW 20 MIN: CPT | Performed by: FAMILY MEDICINE

## 2023-05-11 PROCEDURE — 3017F COLORECTAL CA SCREEN DOC REV: CPT | Performed by: FAMILY MEDICINE

## 2023-05-11 PROCEDURE — 1090F PRES/ABSN URINE INCON ASSESS: CPT | Performed by: FAMILY MEDICINE

## 2023-05-11 PROCEDURE — 1036F TOBACCO NON-USER: CPT | Performed by: FAMILY MEDICINE

## 2023-05-11 PROCEDURE — 1123F ACP DISCUSS/DSCN MKR DOCD: CPT | Performed by: FAMILY MEDICINE

## 2023-05-11 ASSESSMENT — ENCOUNTER SYMPTOMS
ABDOMINAL PAIN: 0
EYE DISCHARGE: 0
SHORTNESS OF BREATH: 0
WHEEZING: 0
EYE REDNESS: 0
RHINORRHEA: 0
SORE THROAT: 0
NAUSEA: 0
DIARRHEA: 0
VOMITING: 0
COUGH: 0

## 2023-05-11 NOTE — PROGRESS NOTES
717 [facility] PRIMARY CARE  [address] [place] [address]  Dept: [facility] [address] Marie Covington Record is a 74 y.o. female Established patient, who presents today for her medical conditions/complaints as noted below  Chief Complaint   Patient presents with    Diabetes     Last a1c 6.4       HPI:     Diabetes  Pertinent negatives for hypoglycemia include no dizziness or headaches. Pertinent negatives for diabetes include no chest pain. Pt doing okay- thumb has been hurting some but otherwise okay. No refills needed. Not checking sugars at home- lost her meter. Diarrhea is better since off invokana  and no itching.       Reviewed prior notes:  None  Reviewed previous:  Labs    Hemoglobin A1C (%)   Date Value   02/08/2023 6.4   10/24/2022 6.5   07/18/2022 6.9             ( goal A1C is < 7)   No results found for: LABMICR  LDL Cholesterol (mg/dL)   Date Value   12/11/2018 85     LDL Calculated (mg/dL)   Date Value   11/20/2017 78   12/15/2016 91       (goal LDL is <100)   AST (U/L)   Date Value   07/20/2022 19     ALT (U/L)   Date Value   07/20/2022 19     BUN (mg/dL)   Date Value   12/30/2022 35 (H)     BP Readings from Last 3 Encounters:   05/11/23 116/70   02/08/23 120/68   10/24/22 120/68          (goal 140/90)    Past Medical History:   Diagnosis Date    Colon polyp     Diabetes mellitus ([address]     Diarrhea     Gallstones     H/O neoplasm of uncertain behavior of skin     Hearing aid worn     B/l    Hearing loss, bilateral     Hyperglycemia 11/08/2015    Hypertension     Stye     right eye    Wears glasses         Social History     Tobacco Use    Smoking status: Never    Smokeless tobacco: Never   Substance Use Topics    Alcohol use: Yes     Comment: rare- 2-3 times per month at the most      Current Outpatient Medications   Medication Sig Dispense Refill    lisinopril-hydroCHLOROthiazide (PRINZIDE;ZESTORETIC) 20-25 MG per tablet Take 1 tablet by mouth daily 90 tablet 3

## 2023-05-12 LAB
ALBUMIN SERPL-MCNC: 4.5 G/DL (ref 3.5–5.2)
ALBUMIN/GLOBULIN RATIO: 1.5 (ref 1–2.5)
ALP BLD-CCNC: 99 U/L (ref 35–104)
ALT SERPL-CCNC: 19 U/L (ref 5–33)
ANION GAP SERPL CALCULATED.3IONS-SCNC: 12 MMOL/L (ref 9–17)
AST SERPL-CCNC: 19 U/L
BILIRUB SERPL-MCNC: 0.6 MG/DL (ref 0.3–1.2)
BUN BLDV-MCNC: 35 MG/DL (ref 8–23)
CALCIUM SERPL-MCNC: 10.4 MG/DL (ref 8.6–10.4)
CHLORIDE BLD-SCNC: 101 MMOL/L (ref 98–107)
CO2: 25 MMOL/L (ref 20–31)
CREAT SERPL-MCNC: 0.9 MG/DL (ref 0.5–0.9)
ESTIMATED AVERAGE GLUCOSE: 154 MG/DL
GFR SERPL CREATININE-BSD FRML MDRD: >60 ML/MIN/1.73M2
GLUCOSE BLD-MCNC: 191 MG/DL (ref 70–99)
HBA1C MFR BLD: 7 % (ref 4–6)
POTASSIUM SERPL-SCNC: 4.6 MMOL/L (ref 3.7–5.3)
SODIUM BLD-SCNC: 138 MMOL/L (ref 135–144)
TOTAL PROTEIN: 7.5 G/DL (ref 6.4–8.3)

## 2023-05-19 ENCOUNTER — TELEPHONE (OUTPATIENT)
Dept: PRIMARY CARE CLINIC | Age: 73
End: 2023-05-19

## 2023-05-19 DIAGNOSIS — E11.9 TYPE 2 DIABETES MELLITUS WITHOUT COMPLICATION, WITHOUT LONG-TERM CURRENT USE OF INSULIN (HCC): Primary | ICD-10-CM

## 2023-05-19 RX ORDER — PIOGLITAZONEHYDROCHLORIDE 15 MG/1
15 TABLET ORAL DAILY
Qty: 90 TABLET | Refills: 3 | Status: SHIPPED | OUTPATIENT
Start: 2023-05-19

## 2023-05-19 NOTE — TELEPHONE ENCOUNTER
I spoke with the patient about this. She says thank you, [facility] sent her an email stating she will receive the med around 05/28/23.

## 2023-05-19 NOTE — TELEPHONE ENCOUNTER
Patient states she was told to call her insurance and find out if Actos is covered. Patient states insurance will cover generic Actos.     [facility]     Please advise

## 2023-08-17 ENCOUNTER — OFFICE VISIT (OUTPATIENT)
Dept: PRIMARY CARE CLINIC | Age: 73
End: 2023-08-17
Payer: MEDICARE

## 2023-08-17 VITALS
SYSTOLIC BLOOD PRESSURE: 116 MMHG | HEIGHT: 62 IN | HEART RATE: 80 BPM | WEIGHT: 154.4 LBS | DIASTOLIC BLOOD PRESSURE: 74 MMHG | BODY MASS INDEX: 28.41 KG/M2 | OXYGEN SATURATION: 97 %

## 2023-08-17 DIAGNOSIS — I10 PRIMARY HYPERTENSION: ICD-10-CM

## 2023-08-17 DIAGNOSIS — E11.9 TYPE 2 DIABETES MELLITUS WITHOUT COMPLICATION, WITHOUT LONG-TERM CURRENT USE OF INSULIN (HCC): Primary | ICD-10-CM

## 2023-08-17 DIAGNOSIS — H61.21 IMPACTED CERUMEN OF RIGHT EAR: ICD-10-CM

## 2023-08-17 LAB — HBA1C MFR BLD: 7 %

## 2023-08-17 PROCEDURE — 99213 OFFICE O/P EST LOW 20 MIN: CPT | Performed by: FAMILY MEDICINE

## 2023-08-17 PROCEDURE — 1123F ACP DISCUSS/DSCN MKR DOCD: CPT | Performed by: FAMILY MEDICINE

## 2023-08-17 PROCEDURE — 2022F DILAT RTA XM EVC RTNOPTHY: CPT | Performed by: FAMILY MEDICINE

## 2023-08-17 PROCEDURE — 3074F SYST BP LT 130 MM HG: CPT | Performed by: FAMILY MEDICINE

## 2023-08-17 PROCEDURE — 3078F DIAST BP <80 MM HG: CPT | Performed by: FAMILY MEDICINE

## 2023-08-17 PROCEDURE — G8399 PT W/DXA RESULTS DOCUMENT: HCPCS | Performed by: FAMILY MEDICINE

## 2023-08-17 PROCEDURE — G8417 CALC BMI ABV UP PARAM F/U: HCPCS | Performed by: FAMILY MEDICINE

## 2023-08-17 PROCEDURE — 3051F HG A1C>EQUAL 7.0%<8.0%: CPT | Performed by: FAMILY MEDICINE

## 2023-08-17 PROCEDURE — 83037 HB GLYCOSYLATED A1C HOME DEV: CPT | Performed by: FAMILY MEDICINE

## 2023-08-17 PROCEDURE — 3017F COLORECTAL CA SCREEN DOC REV: CPT | Performed by: FAMILY MEDICINE

## 2023-08-17 PROCEDURE — 1090F PRES/ABSN URINE INCON ASSESS: CPT | Performed by: FAMILY MEDICINE

## 2023-08-17 PROCEDURE — 1036F TOBACCO NON-USER: CPT | Performed by: FAMILY MEDICINE

## 2023-08-17 PROCEDURE — G8427 DOCREV CUR MEDS BY ELIG CLIN: HCPCS | Performed by: FAMILY MEDICINE

## 2023-08-17 RX ORDER — BLOOD-GLUCOSE METER
1 KIT MISCELLANEOUS DAILY
Qty: 1 KIT | Refills: 0 | Status: SHIPPED | OUTPATIENT
Start: 2023-08-17

## 2023-08-17 RX ORDER — GLUCOSAMINE HCL/CHONDROITIN SU 500-400 MG
CAPSULE ORAL
Qty: 100 STRIP | Refills: 3 | Status: SHIPPED | OUTPATIENT
Start: 2023-08-17

## 2023-08-17 ASSESSMENT — ENCOUNTER SYMPTOMS
EYE DISCHARGE: 0
WHEEZING: 0
VOMITING: 0
EYE REDNESS: 0
COUGH: 0
DIARRHEA: 0
NAUSEA: 0
SHORTNESS OF BREATH: 0
ABDOMINAL PAIN: 0
RHINORRHEA: 0
SORE THROAT: 0

## 2023-08-17 NOTE — PROGRESS NOTES
[address] PRIMARY CARE  [address] [facility] [address]  Dept: 60[address] [facility] Marika Fung is a 74 y.o. female Established patient, who presents today for her medical conditions/complaints as noted below  Chief Complaint   Patient presents with    Diabetes     Last a1c 7.0       HPI:     Diabetes  Pertinent negatives for hypoglycemia include no dizziness or headaches. Pertinent negatives for diabetes include no chest pain. Pt has not been checking her sugar. Monitor is broken. Needs new one. Reviewed prior notes:  None  Reviewed previous:  Labs    Hemoglobin A1C (%)   Date Value   08/17/2023 7.0   05/11/2023 7.0 (H)   02/08/2023 6.4             ( goal A1C is < 7)   No components found for: LABMICR  LDL Cholesterol (mg/dL)   Date Value   12/11/2018 85     LDL Calculated (mg/dL)   Date Value   11/20/2017 78   12/15/2016 91       (goal LDL is <100)   AST (U/L)   Date Value   05/11/2023 19     ALT (U/L)   Date Value   05/11/2023 19     BUN (mg/dL)   Date Value   05/11/2023 35 (H)     BP Readings from Last 3 Encounters:   08/17/23 116/74   05/11/23 116/70   02/08/23 120/68          (goal 140/90)    Past Medical History:   Diagnosis Date    Colon polyp     Diabetes mellitus ([address])     Diarrhea     Gallstones     H/O neoplasm of uncertain behavior of skin     Hearing aid worn     B/l    Hearing loss, bilateral     Hyperglycemia 11/08/2015    Hypertension     Stye     right eye    Wears glasses         Social History     Tobacco Use    Smoking status: Never    Smokeless tobacco: Never   Substance Use Topics    Alcohol use: Yes     Comment: rare- 2-3 times per month at the most      Current Outpatient Medications   Medication Sig Dispense Refill    glucose monitoring (FREESTYLE FREEDOM) kit 1 kit by Does not apply route daily 1 kit 0    blood glucose monitor strips Test 1 times a day & as needed for symptoms of irregular blood glucose.  Dispense sufficient amount for

## 2023-11-26 SDOH — HEALTH STABILITY: PHYSICAL HEALTH: ON AVERAGE, HOW MANY MINUTES DO YOU ENGAGE IN EXERCISE AT THIS LEVEL?: 30 MIN

## 2023-11-26 SDOH — HEALTH STABILITY: PHYSICAL HEALTH: ON AVERAGE, HOW MANY DAYS PER WEEK DO YOU ENGAGE IN MODERATE TO STRENUOUS EXERCISE (LIKE A BRISK WALK)?: 3 DAYS

## 2023-11-26 ASSESSMENT — PATIENT HEALTH QUESTIONNAIRE - PHQ9
SUM OF ALL RESPONSES TO PHQ QUESTIONS 1-9: 0
1. LITTLE INTEREST OR PLEASURE IN DOING THINGS: 0
SUM OF ALL RESPONSES TO PHQ QUESTIONS 1-9: 0
2. FEELING DOWN, DEPRESSED OR HOPELESS: 0
SUM OF ALL RESPONSES TO PHQ QUESTIONS 1-9: 0
SUM OF ALL RESPONSES TO PHQ QUESTIONS 1-9: 0
SUM OF ALL RESPONSES TO PHQ9 QUESTIONS 1 & 2: 0

## 2023-11-26 ASSESSMENT — LIFESTYLE VARIABLES
HOW OFTEN DO YOU HAVE A DRINK CONTAINING ALCOHOL: 2-4 TIMES A MONTH
HOW OFTEN DO YOU HAVE SIX OR MORE DRINKS ON ONE OCCASION: 1
HOW OFTEN DO YOU HAVE A DRINK CONTAINING ALCOHOL: 3
HOW MANY STANDARD DRINKS CONTAINING ALCOHOL DO YOU HAVE ON A TYPICAL DAY: 1
HOW MANY STANDARD DRINKS CONTAINING ALCOHOL DO YOU HAVE ON A TYPICAL DAY: 1 OR 2

## 2023-11-29 ENCOUNTER — OFFICE VISIT (OUTPATIENT)
Dept: PRIMARY CARE CLINIC | Age: 73
End: 2023-11-29
Payer: MEDICARE

## 2023-11-29 VITALS
SYSTOLIC BLOOD PRESSURE: 132 MMHG | HEIGHT: 62 IN | DIASTOLIC BLOOD PRESSURE: 64 MMHG | HEART RATE: 74 BPM | OXYGEN SATURATION: 99 % | WEIGHT: 156.6 LBS | BODY MASS INDEX: 28.82 KG/M2

## 2023-11-29 DIAGNOSIS — Z00.00 MEDICARE ANNUAL WELLNESS VISIT, SUBSEQUENT: Primary | ICD-10-CM

## 2023-11-29 DIAGNOSIS — E11.9 TYPE 2 DIABETES MELLITUS WITHOUT COMPLICATION, WITHOUT LONG-TERM CURRENT USE OF INSULIN (HCC): ICD-10-CM

## 2023-11-29 LAB
CREATININE URINE POCT: 200
HBA1C MFR BLD: 6.7 %
MICROALBUMIN/CREAT 24H UR: 30 MG/G{CREAT}
MICROALBUMIN/CREAT UR-RTO: <30

## 2023-11-29 PROCEDURE — G0439 PPPS, SUBSEQ VISIT: HCPCS | Performed by: FAMILY MEDICINE

## 2023-11-29 PROCEDURE — 3044F HG A1C LEVEL LT 7.0%: CPT | Performed by: FAMILY MEDICINE

## 2023-11-29 PROCEDURE — 3078F DIAST BP <80 MM HG: CPT | Performed by: FAMILY MEDICINE

## 2023-11-29 PROCEDURE — 83036 HEMOGLOBIN GLYCOSYLATED A1C: CPT | Performed by: FAMILY MEDICINE

## 2023-11-29 PROCEDURE — 3075F SYST BP GE 130 - 139MM HG: CPT | Performed by: FAMILY MEDICINE

## 2023-11-29 PROCEDURE — 3017F COLORECTAL CA SCREEN DOC REV: CPT | Performed by: FAMILY MEDICINE

## 2023-11-29 PROCEDURE — G8484 FLU IMMUNIZE NO ADMIN: HCPCS | Performed by: FAMILY MEDICINE

## 2023-11-29 PROCEDURE — 1123F ACP DISCUSS/DSCN MKR DOCD: CPT | Performed by: FAMILY MEDICINE

## 2023-11-29 PROCEDURE — 82044 UR ALBUMIN SEMIQUANTITATIVE: CPT | Performed by: FAMILY MEDICINE

## 2023-11-29 NOTE — PROGRESS NOTES
tablet Take 1 tablet by mouth daily Yes Provider, MD Carmen   Flaxseed, Linseed, 1000 MG CAPS Take 1 tablet by mouth.  Yes Provider, MD Camren   Multiple Vitamins-Minerals (THERAPEUTIC MULTIVITAMIN-MINERALS) tablet Take 1 tablet by mouth daily Yes ProviderCarmen MD       CareTeam (Including outside providers/suppliers regularly involved in providing care):   Patient Care Team:  Megan Payton MD as PCP - Eva Iqbal MD as PCP - Empaneled Provider     Reviewed and updated this visit:  Tobacco  Allergies  Meds  Med Hx  Surg Hx  Soc Hx  Fam Hx

## 2023-11-29 NOTE — PATIENT INSTRUCTIONS
Personalized Preventive Plan for Tali Dan - 11/29/2023  Medicare offers a range of preventive health benefits. Some of the tests and screenings are paid in full while other may be subject to a deductible, co-insurance, and/or copay. Some of these benefits include a comprehensive review of your medical history including lifestyle, illnesses that may run in your family, and various assessments and screenings as appropriate. After reviewing your medical record and screening and assessments performed today your provider may have ordered immunizations, labs, imaging, and/or referrals for you. A list of these orders (if applicable) as well as your Preventive Care list are included within your After Visit Summary for your review. Other Preventive Recommendations:    A preventive eye exam performed by an eye specialist is recommended every 1-2 years to screen for glaucoma; cataracts, macular degeneration, and other eye disorders. A preventive dental visit is recommended every 6 months. Try to get at least 150 minutes of exercise per week or 10,000 steps per day on a pedometer . Order or download the FREE \"Exercise & Physical Activity: Your Everyday Guide\" from [employer]. Call 8-491.760.3900 or search [employer] online. You need 8423-2820 mg of calcium and 7273-8720 IU of vitamin D per day. It is possible to meet your calcium requirement with diet alone, but a vitamin D supplement is usually necessary to meet this goal.  When exposed to the sun, use a sunscreen that protects against both UVA and UVB radiation with an SPF of 30 or greater. Reapply every 2 to 3 hours or after sweating, drying off with a towel, or swimming. Always wear a seat belt when traveling in a car. Always wear a helmet when riding a bicycle or motorcycle.

## 2023-12-11 ENCOUNTER — HOSPITAL ENCOUNTER (OUTPATIENT)
Dept: WOMENS IMAGING | Age: 73
Discharge: HOME | End: 2023-12-13
Payer: MEDICARE

## 2023-12-11 VITALS — BODY MASS INDEX: 29.45 KG/M2 | WEIGHT: 156 LBS | HEIGHT: 61 IN

## 2023-12-11 DIAGNOSIS — Z12.31 ENCOUNTER FOR SCREENING MAMMOGRAM FOR MALIGNANT NEOPLASM OF BREAST: ICD-10-CM

## 2023-12-11 PROCEDURE — 77063 BREAST TOMOSYNTHESIS BI: CPT

## 2024-01-02 NOTE — TELEPHONE ENCOUNTER
LAST VISIT:   11/29/2023     Future Appointments   Date Time Provider Department Center   3/6/2024  1:15 PM [facility]

## 2024-01-03 RX ORDER — CHOLESTYRAMINE 4 G/9G
1 POWDER, FOR SUSPENSION ORAL 2 TIMES DAILY
Qty: 60 PACKET | Refills: 3 | Status: SHIPPED | OUTPATIENT
Start: 2024-01-03

## 2024-01-25 DIAGNOSIS — E11.9 TYPE 2 DIABETES MELLITUS WITHOUT COMPLICATION, WITHOUT LONG-TERM CURRENT USE OF INSULIN (HCC): ICD-10-CM

## 2024-01-25 DIAGNOSIS — I10 ESSENTIAL HYPERTENSION: ICD-10-CM

## 2024-01-25 RX ORDER — PIOGLITAZONEHYDROCHLORIDE 15 MG/1
15 TABLET ORAL DAILY
Qty: 90 TABLET | Refills: 3 | Status: SHIPPED | OUTPATIENT
Start: 2024-01-25

## 2024-01-25 RX ORDER — METFORMIN HYDROCHLORIDE 500 MG/1
1000 TABLET, EXTENDED RELEASE ORAL
Qty: 180 TABLET | Refills: 3 | Status: SHIPPED | OUTPATIENT
Start: 2024-01-25

## 2024-01-25 RX ORDER — ATORVASTATIN CALCIUM 20 MG/1
20 TABLET, FILM COATED ORAL DAILY
Qty: 90 TABLET | Refills: 3 | Status: SHIPPED | OUTPATIENT
Start: 2024-01-25

## 2024-01-25 RX ORDER — LISINOPRIL AND HYDROCHLOROTHIAZIDE 25; 20 MG/1; MG/1
TABLET ORAL
Qty: 90 TABLET | Refills: 3 | Status: SHIPPED | OUTPATIENT
Start: 2024-01-25

## 2024-03-06 ENCOUNTER — OFFICE VISIT (OUTPATIENT)
Dept: PRIMARY CARE CLINIC | Age: 74
End: 2024-03-06

## 2024-03-06 VITALS
HEART RATE: 74 BPM | SYSTOLIC BLOOD PRESSURE: 124 MMHG | OXYGEN SATURATION: 99 % | BODY MASS INDEX: 29.59 KG/M2 | DIASTOLIC BLOOD PRESSURE: 72 MMHG | WEIGHT: 156.6 LBS

## 2024-03-06 DIAGNOSIS — H91.90 HEARING LOSS, UNSPECIFIED HEARING LOSS TYPE, UNSPECIFIED LATERALITY: ICD-10-CM

## 2024-03-06 DIAGNOSIS — R73.9 HYPERGLYCEMIA: ICD-10-CM

## 2024-03-06 DIAGNOSIS — E11.9 TYPE 2 DIABETES MELLITUS WITHOUT COMPLICATION, WITHOUT LONG-TERM CURRENT USE OF INSULIN (HCC): Primary | ICD-10-CM

## 2024-03-06 DIAGNOSIS — Z13.220 SCREENING FOR HYPERLIPIDEMIA: ICD-10-CM

## 2024-03-06 LAB — HBA1C MFR BLD: 6.9 %

## 2024-03-06 RX ORDER — MELATONIN
1000 DAILY
Qty: 30 TABLET | Refills: 0 | COMMUNITY
Start: 2024-03-06

## 2024-03-06 RX ORDER — GLUCOSAMINE HCL/CHONDROITIN SU 500-400 MG
CAPSULE ORAL
Qty: 100 STRIP | Refills: 3 | Status: SHIPPED | OUTPATIENT
Start: 2024-03-06

## 2024-03-06 SDOH — ECONOMIC STABILITY: FOOD INSECURITY: WITHIN THE PAST 12 MONTHS, YOU WORRIED THAT YOUR FOOD WOULD RUN OUT BEFORE YOU GOT MONEY TO BUY MORE.: NEVER TRUE

## 2024-03-06 SDOH — ECONOMIC STABILITY: FOOD INSECURITY: WITHIN THE PAST 12 MONTHS, THE FOOD YOU BOUGHT JUST DIDN'T LAST AND YOU DIDN'T HAVE MONEY TO GET MORE.: NEVER TRUE

## 2024-03-06 SDOH — ECONOMIC STABILITY: INCOME INSECURITY: HOW HARD IS IT FOR YOU TO PAY FOR THE VERY BASICS LIKE FOOD, HOUSING, MEDICAL CARE, AND HEATING?: NOT HARD AT ALL

## 2024-03-06 ASSESSMENT — ENCOUNTER SYMPTOMS
NAUSEA: 0
SHORTNESS OF BREATH: 0
DIARRHEA: 1
WHEEZING: 0
COUGH: 0
RHINORRHEA: 0
SORE THROAT: 0
ABDOMINAL PAIN: 0
VOMITING: 0

## 2024-03-06 ASSESSMENT — PATIENT HEALTH QUESTIONNAIRE - PHQ9
2. FEELING DOWN, DEPRESSED OR HOPELESS: 0
SUM OF ALL RESPONSES TO PHQ QUESTIONS 1-9: 0
SUM OF ALL RESPONSES TO PHQ9 QUESTIONS 1 & 2: 0
1. LITTLE INTEREST OR PLEASURE IN DOING THINGS: 0
SUM OF ALL RESPONSES TO PHQ QUESTIONS 1-9: 0

## 2024-03-06 NOTE — PROGRESS NOTES
[facility]  [facility] PRIMARY CARE  [address] [facility] [address]  Dept: [phone]    Ann Marie Banegas is a 73 y.o. female Established patient, who presents today for her medical conditions/complaints as noted below  Chief Complaint   Patient presents with    Diabetes       HPI:     Diabetes  Pertinent negatives for hypoglycemia include no dizziness, headaches or nervousness/anxiousness. Pertinent negatives for diabetes include no chest pain, no polydipsia, no polyuria and no weakness.     Pt. Not checking sugar at home - just found her glucometer. Needs new strips. Taking her meds as prescribed.   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   Pt. Tries to eat healthy. Tries to eat more veggies and avoid sweets. Eats a lot of pasta and pizza.     Pt. Has hearing test scheduled on the 15th. Needs a referral for Medicare to cover part of it.     Reviewed prior notes:  None  Reviewed previous:   na    Hemoglobin A1C (%)   Date Value   03/06/2024 6.9   11/29/2023 6.7   08/17/2023 7.0             ( goal A1C is < 7)   No components found for: \"LABMICR\"  LDL Cholesterol (mg/dL)   Date Value   12/11/2018 85     LDL Calculated (mg/dL)   Date Value   11/20/2017 78   12/15/2016 91       (goal LDL is <100)   AST (U/L)   Date Value   05/11/2023 19     ALT (U/L)   Date Value   05/11/2023 19     BUN (mg/dL)   Date Value   05/11/2023 35 (H)     BP Readings from Last 3 Encounters:   03/06/24 124/72   11/29/23 132/64   08/17/23 116/74          (goal 140/90)    Past Medical History:   Diagnosis Date    Colon polyp     Diabetes mellitus (HCC)     Diarrhea     Gallstones     H/O neoplasm of uncertain behavior of skin     Hearing aid worn     B/l    Hearing loss, bilateral     Hyperglycemia 11/08/2015    Hypertension     Stye     right eye    Type 2 diabetes mellitus without complication (HCC)     Wears glasses         Social History     Tobacco Use

## 2024-04-12 ENCOUNTER — OFFICE VISIT (OUTPATIENT)
Dept: PRIMARY CARE CLINIC | Age: 74
End: 2024-04-12

## 2024-04-12 VITALS
OXYGEN SATURATION: 96 % | WEIGHT: 157.2 LBS | HEIGHT: 61 IN | BODY MASS INDEX: 29.68 KG/M2 | HEART RATE: 88 BPM | SYSTOLIC BLOOD PRESSURE: 118 MMHG | DIASTOLIC BLOOD PRESSURE: 66 MMHG

## 2024-04-12 DIAGNOSIS — M75.41 IMPINGEMENT SYNDROME OF RIGHT SHOULDER: Primary | ICD-10-CM

## 2024-04-12 DIAGNOSIS — E11.9 TYPE 2 DIABETES MELLITUS WITHOUT COMPLICATION, WITHOUT LONG-TERM CURRENT USE OF INSULIN (HCC): ICD-10-CM

## 2024-04-12 DIAGNOSIS — R73.9 HYPERGLYCEMIA: ICD-10-CM

## 2024-04-12 RX ORDER — BLOOD-GLUCOSE METER
1 KIT MISCELLANEOUS DAILY
Qty: 1 KIT | Refills: 0 | Status: SHIPPED | OUTPATIENT
Start: 2024-04-12

## 2024-04-12 RX ORDER — GLUCOSAMINE HCL/CHONDROITIN SU 500-400 MG
CAPSULE ORAL
Qty: 100 STRIP | Refills: 3 | Status: SHIPPED | OUTPATIENT
Start: 2024-04-12

## 2024-04-12 RX ORDER — PREDNISONE 5 MG/1
TABLET ORAL
Qty: 36 TABLET | Refills: 0 | Status: SHIPPED | OUTPATIENT
Start: 2024-04-12

## 2024-04-12 NOTE — PROGRESS NOTES
[facility]  [facility] PRIMARY CARE  [address] [facility] [address]  [facility] [address]  Dept: [phone]    Ann Marie Banegas is a 73 y.o. female Established patient, who presents today for her medical conditions/complaints as noted below.      Chief Complaint   Patient presents with    Arm Pain     Pt c/o RT arm and shoulder pain x4-5 months.       HPI:   Has been getting massage therapy and helps but not getting better. No radiation of pain.  No N/T.  Pulled it while working outdoors in the fall.  Hard to put her coat on or put earrings in. 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  Is right handed.  Has not taken anything but has used topical occas.     Reviewed prior notes: None   Reviewed previous:  Imaging    LDL Cholesterol (mg/dL)   Date Value   12/11/2018 85     LDL Calculated (mg/dL)   Date Value   11/20/2017 78   12/15/2016 91       (goal LDL is <100)   AST (U/L)   Date Value   05/11/2023 19     ALT (U/L)   Date Value   05/11/2023 19     BUN (mg/dL)   Date Value   05/11/2023 35 (H)     Hemoglobin A1C (%)   Date Value   03/06/2024 6.9     BP Readings from Last 3 Encounters:   04/12/24 118/66   03/06/24 124/72   11/29/23 132/64          (goal 120/80)    Past Medical History:   Diagnosis Date    Colon polyp     Diabetes mellitus (HCC)     Diarrhea     Gallstones     H/O neoplasm of uncertain behavior of skin     Hearing aid worn     B/l    Hearing loss, bilateral     Hyperglycemia 11/08/2015    Hypertension     Stye     right eye    Type 2 diabetes mellitus without complication (HCC)     Wears glasses       Past Surgical History:   Procedure Laterality Date    BREAST BIOPSY Right     CARDIAC CATHETERIZATION  05/07/2014    CHOLECYSTECTOMY, LAPAROSCOPIC N/A 2/2/2021    CHOLECYSTECTOMY LAPAROSCOPIC ROBOTIC XI performed by Bryan Paez MD at [facility] OR    COLONOSCOPY      COLONOSCOPY N/A 2/1/2021    COLONOSCOPY WITH BIOPSY performed by Bryan Paez MD at [facility]  No significant past surgical history

## 2024-07-05 ENCOUNTER — OFFICE VISIT (OUTPATIENT)
Dept: PRIMARY CARE CLINIC | Age: 74
End: 2024-07-05

## 2024-07-05 VITALS
SYSTOLIC BLOOD PRESSURE: 122 MMHG | WEIGHT: 158 LBS | HEIGHT: 61 IN | OXYGEN SATURATION: 97 % | DIASTOLIC BLOOD PRESSURE: 70 MMHG | BODY MASS INDEX: 29.83 KG/M2 | HEART RATE: 76 BPM

## 2024-07-05 DIAGNOSIS — E11.9 TYPE 2 DIABETES MELLITUS WITHOUT COMPLICATION, WITHOUT LONG-TERM CURRENT USE OF INSULIN (HCC): ICD-10-CM

## 2024-07-05 LAB — HBA1C MFR BLD: 7.2 %

## 2024-07-05 RX ORDER — PIOGLITAZONEHYDROCHLORIDE 30 MG/1
30 TABLET ORAL DAILY
Qty: 90 TABLET | Refills: 3 | Status: SHIPPED | OUTPATIENT
Start: 2024-07-05

## 2024-07-05 ASSESSMENT — ENCOUNTER SYMPTOMS
EYE REDNESS: 0
WHEEZING: 0
RHINORRHEA: 0
ABDOMINAL PAIN: 0
VOMITING: 0
EYE DISCHARGE: 0
DIARRHEA: 0
NAUSEA: 0
SORE THROAT: 0
SHORTNESS OF BREATH: 0
COUGH: 0

## 2024-07-05 NOTE — PROGRESS NOTES
and exercise.  Patient agreed with treatment plan. Follow up as directed.     Electronically signed by Raquel Chen MD on 7/5/2024 at 8:37 AM

## 2024-08-27 ENCOUNTER — TELEPHONE (OUTPATIENT)
Dept: PRIMARY CARE CLINIC | Age: 74
End: 2024-08-27

## 2024-08-27 NOTE — TELEPHONE ENCOUNTER
Patient tested positive for covid yesterday with a home test. Sx started Sunday. Asking for paxlovid and what else is recommended for her? [facility]

## 2024-08-27 NOTE — TELEPHONE ENCOUNTER
Just do symptomatic treatment, tylenol for fever and aches, delsym or mucinex for cough and congestion.  Paxlovid sent in

## 2024-10-07 ENCOUNTER — OFFICE VISIT (OUTPATIENT)
Dept: PRIMARY CARE CLINIC | Age: 74
End: 2024-10-07
Payer: MEDICARE

## 2024-10-07 VITALS
HEART RATE: 83 BPM | WEIGHT: 156.8 LBS | SYSTOLIC BLOOD PRESSURE: 126 MMHG | OXYGEN SATURATION: 97 % | BODY MASS INDEX: 29.6 KG/M2 | HEIGHT: 61 IN | DIASTOLIC BLOOD PRESSURE: 68 MMHG

## 2024-10-07 DIAGNOSIS — E11.9 TYPE 2 DIABETES MELLITUS WITHOUT COMPLICATION, WITHOUT LONG-TERM CURRENT USE OF INSULIN (HCC): Primary | ICD-10-CM

## 2024-10-07 DIAGNOSIS — H65.01 NON-RECURRENT ACUTE SEROUS OTITIS MEDIA OF RIGHT EAR: ICD-10-CM

## 2024-10-07 LAB — HBA1C MFR BLD: 6.6 %

## 2024-10-07 PROCEDURE — G8399 PT W/DXA RESULTS DOCUMENT: HCPCS | Performed by: FAMILY MEDICINE

## 2024-10-07 PROCEDURE — 1090F PRES/ABSN URINE INCON ASSESS: CPT | Performed by: FAMILY MEDICINE

## 2024-10-07 PROCEDURE — 3074F SYST BP LT 130 MM HG: CPT | Performed by: FAMILY MEDICINE

## 2024-10-07 PROCEDURE — 1036F TOBACCO NON-USER: CPT | Performed by: FAMILY MEDICINE

## 2024-10-07 PROCEDURE — 3044F HG A1C LEVEL LT 7.0%: CPT | Performed by: FAMILY MEDICINE

## 2024-10-07 PROCEDURE — 99213 OFFICE O/P EST LOW 20 MIN: CPT | Performed by: FAMILY MEDICINE

## 2024-10-07 PROCEDURE — 83036 HEMOGLOBIN GLYCOSYLATED A1C: CPT | Performed by: FAMILY MEDICINE

## 2024-10-07 PROCEDURE — G8427 DOCREV CUR MEDS BY ELIG CLIN: HCPCS | Performed by: FAMILY MEDICINE

## 2024-10-07 PROCEDURE — G8484 FLU IMMUNIZE NO ADMIN: HCPCS | Performed by: FAMILY MEDICINE

## 2024-10-07 PROCEDURE — 1123F ACP DISCUSS/DSCN MKR DOCD: CPT | Performed by: FAMILY MEDICINE

## 2024-10-07 PROCEDURE — 3078F DIAST BP <80 MM HG: CPT | Performed by: FAMILY MEDICINE

## 2024-10-07 PROCEDURE — G8417 CALC BMI ABV UP PARAM F/U: HCPCS | Performed by: FAMILY MEDICINE

## 2024-10-07 RX ORDER — GUAIFENESIN 600 MG/1
600 TABLET, EXTENDED RELEASE ORAL 2 TIMES DAILY
COMMUNITY
Start: 2024-10-07 | End: 2024-10-17

## 2024-10-07 RX ORDER — FLUTICASONE PROPIONATE 50 MCG
2 SPRAY, SUSPENSION (ML) NASAL DAILY
COMMUNITY
Start: 2024-10-07

## 2024-10-07 ASSESSMENT — PATIENT HEALTH QUESTIONNAIRE - PHQ9
SUM OF ALL RESPONSES TO PHQ QUESTIONS 1-9: 0
SUM OF ALL RESPONSES TO PHQ9 QUESTIONS 1 & 2: 0
SUM OF ALL RESPONSES TO PHQ QUESTIONS 1-9: 0
1. LITTLE INTEREST OR PLEASURE IN DOING THINGS: NOT AT ALL
SUM OF ALL RESPONSES TO PHQ QUESTIONS 1-9: 0
2. FEELING DOWN, DEPRESSED OR HOPELESS: NOT AT ALL
SUM OF ALL RESPONSES TO PHQ QUESTIONS 1-9: 0

## 2024-10-07 ASSESSMENT — ENCOUNTER SYMPTOMS
DIARRHEA: 1
NAUSEA: 0
SHORTNESS OF BREATH: 0
VOMITING: 0

## 2024-10-07 NOTE — PROGRESS NOTES
[facility]  [facility] PRIMARY CARE  [address] [address]  [facility] [address]  Dept: [phone]    Ann Marie Banegas is a 74 y.o. female Established patient, who presents today for her medical conditions/complaints as noted below  Chief Complaint   Patient presents with    Diabetes    Otalgia     Patient complains of RT ear pain x1 month    Arm Pain     Patient complains of LT arm pain x1 month       HPI:     Diabetes  Pertinent negatives for hypoglycemia include no dizziness or headaches.   Otalgia   Associated symptoms include diarrhea (her normal). Pertinent negatives include no headaches or vomiting.   Arm Pain       Has had pain in her ear since she got Covid- had to fly home from [place].    Has pain in her forearm (left) when she rests her arm down sometimes.  But cannot feel anything or reproduce the pain.    Had one episode of low BS. Grape juice helped.     Reviewed prior notes:  None  Reviewed previous:  Labs    Hemoglobin A1C (%)   Date Value   10/07/2024 6.6   07/05/2024 7.2   03/06/2024 6.9             ( goal A1C is < 7)   No components found for: \"LABMICR\"  No components found for: \"LDLCHOLESTEROL\", \"LDLCALC\"    (goal LDL is <100)   AST (U/L)   Date Value   05/11/2023 19     ALT (U/L)   Date Value   05/11/2023 19     BUN (mg/dL)   Date Value   05/11/2023 35 (H)     BP Readings from Last 3 Encounters:   10/07/24 126/68   07/05/24 122/70   04/12/24 118/66          (goal 140/90)    Past Medical History:   Diagnosis Date    Colon polyp     Diabetes mellitus (HCC)     Diarrhea     Gallstones     H/O neoplasm of uncertain behavior of skin     Hearing aid worn     B/l    Hearing loss, bilateral     Hyperglycemia 11/08/2015    Hypertension     Stye     right eye    Type 2 diabetes mellitus without complication (HCC)     Wears glasses         Social History     Tobacco Use    Smoking status: Never    Smokeless tobacco: Never   Substance Use Topics    Alcohol use: Yes     Alcohol/week:

## 2024-12-16 RX ORDER — CHOLESTYRAMINE 4 G/9G
POWDER, FOR SUSPENSION ORAL
Qty: 60 EACH | Refills: 2 | Status: SHIPPED | OUTPATIENT
Start: 2024-12-16

## 2024-12-27 DIAGNOSIS — I10 ESSENTIAL HYPERTENSION: ICD-10-CM

## 2024-12-27 RX ORDER — METFORMIN HYDROCHLORIDE 500 MG/1
TABLET, EXTENDED RELEASE ORAL
Qty: 180 TABLET | Refills: 3 | Status: SHIPPED | OUTPATIENT
Start: 2024-12-27

## 2024-12-27 RX ORDER — ATORVASTATIN CALCIUM 20 MG/1
20 TABLET, FILM COATED ORAL DAILY
Qty: 90 TABLET | Refills: 3 | Status: SHIPPED | OUTPATIENT
Start: 2024-12-27

## 2024-12-27 RX ORDER — LISINOPRIL AND HYDROCHLOROTHIAZIDE 20; 25 MG/1; MG/1
TABLET ORAL
Qty: 90 TABLET | Refills: 3 | Status: SHIPPED | OUTPATIENT
Start: 2024-12-27

## 2024-12-31 ENCOUNTER — HOSPITAL ENCOUNTER (OUTPATIENT)
Dept: MAMMOGRAPHY | Age: 74
Discharge: HOME | End: 2025-01-02
Payer: MEDICARE

## 2024-12-31 DIAGNOSIS — Z12.31 ENCOUNTER FOR SCREENING MAMMOGRAM FOR MALIGNANT NEOPLASM OF BREAST: ICD-10-CM

## 2024-12-31 PROCEDURE — 77063 BREAST TOMOSYNTHESIS BI: CPT

## 2025-01-05 ASSESSMENT — PATIENT HEALTH QUESTIONNAIRE - PHQ9
2. FEELING DOWN, DEPRESSED OR HOPELESS: NOT AT ALL
1. LITTLE INTEREST OR PLEASURE IN DOING THINGS: NOT AT ALL
SUM OF ALL RESPONSES TO PHQ QUESTIONS 1-9: 0
SUM OF ALL RESPONSES TO PHQ9 QUESTIONS 1 & 2: 0
1. LITTLE INTEREST OR PLEASURE IN DOING THINGS: NOT AT ALL
SUM OF ALL RESPONSES TO PHQ QUESTIONS 1-9: 0
SUM OF ALL RESPONSES TO PHQ9 QUESTIONS 1 & 2: 0
SUM OF ALL RESPONSES TO PHQ QUESTIONS 1-9: 0
SUM OF ALL RESPONSES TO PHQ QUESTIONS 1-9: 0
2. FEELING DOWN, DEPRESSED OR HOPELESS: NOT AT ALL

## 2025-01-08 ENCOUNTER — HOSPITAL ENCOUNTER (OUTPATIENT)
Age: 75
Setting detail: SPECIMEN
Discharge: HOME | End: 2025-01-08

## 2025-01-08 ENCOUNTER — OFFICE VISIT (OUTPATIENT)
Dept: PRIMARY CARE CLINIC | Age: 75
End: 2025-01-08

## 2025-01-08 VITALS
HEART RATE: 77 BPM | DIASTOLIC BLOOD PRESSURE: 68 MMHG | HEIGHT: 61 IN | WEIGHT: 158.4 LBS | BODY MASS INDEX: 29.91 KG/M2 | OXYGEN SATURATION: 99 % | SYSTOLIC BLOOD PRESSURE: 112 MMHG

## 2025-01-08 DIAGNOSIS — E11.9 TYPE 2 DIABETES MELLITUS WITHOUT COMPLICATION, WITHOUT LONG-TERM CURRENT USE OF INSULIN (HCC): ICD-10-CM

## 2025-01-08 DIAGNOSIS — I10 PRIMARY HYPERTENSION: ICD-10-CM

## 2025-01-08 DIAGNOSIS — H91.90 HEARING DISORDER, UNSPECIFIED LATERALITY: ICD-10-CM

## 2025-01-08 DIAGNOSIS — E11.9 TYPE 2 DIABETES MELLITUS WITHOUT COMPLICATION, WITHOUT LONG-TERM CURRENT USE OF INSULIN (HCC): Primary | ICD-10-CM

## 2025-01-08 LAB
ALBUMIN/GLOBULIN RATIO: 1.7 (ref 1–2.5)
ALBUMIN: 4.5 G/DL (ref 3.5–5.2)
ALP BLD-CCNC: 81 U/L (ref 35–104)
ALT SERPL-CCNC: 20 U/L (ref 10–35)
ANION GAP SERPL CALCULATED.3IONS-SCNC: 10 MMOL/L (ref 9–16)
AST SERPL-CCNC: 22 U/L (ref 10–35)
BASOPHILS ABSOLUTE: 0.05 K/UL (ref 0–0.2)
BASOPHILS RELATIVE PERCENT: 1 % (ref 0–2)
BILIRUB SERPL-MCNC: 0.4 MG/DL (ref 0–1.2)
BUN BLDV-MCNC: 29 MG/DL (ref 8–23)
CALCIUM SERPL-MCNC: 10.2 MG/DL (ref 8.6–10.4)
CHLORIDE BLD-SCNC: 104 MMOL/L (ref 98–107)
CO2: 28 MMOL/L (ref 20–31)
CREAT SERPL-MCNC: 0.9 MG/DL (ref 0.6–0.9)
CREAT UR-MCNC: 102 MG/DL (ref 28–217)
EOSINOPHILS ABSOLUTE: 0.14 K/UL (ref 0–0.44)
EOSINOPHILS RELATIVE PERCENT: 3 % (ref 1–4)
GFR, ESTIMATED: 66 ML/MIN/1.73M2
GLUCOSE BLD-MCNC: 129 MG/DL (ref 74–99)
HBA1C MFR BLD: 6.1 %
HCT VFR BLD CALC: 37.6 % (ref 36.3–47.1)
HEMOGLOBIN: 12.2 G/DL (ref 11.9–15.1)
IMMATURE GRANULOCYTES %: 0 %
IMMATURE GRANULOCYTES ABSOLUTE: <0.03 K/UL (ref 0–0.3)
LYMPHOCYTES ABSOLUTE: 1.32 K/UL (ref 1.1–3.7)
LYMPHOCYTES RELATIVE PERCENT: 32 % (ref 24–43)
MCH RBC QN AUTO: 31.7 PG (ref 25.2–33.5)
MCHC RBC AUTO-ENTMCNC: 32.4 G/DL (ref 28.4–34.8)
MCV RBC AUTO: 97.7 FL (ref 82.6–102.9)
MICROALBUMIN UR-MCNC: <12 MG/L (ref 0–20)
MICROALBUMIN/CREAT UR-RTO: NORMAL MCG/MG CREAT (ref 0–25)
MONOCYTES ABSOLUTE: 0.35 K/UL (ref 0.1–1.2)
MONOCYTES RELATIVE PERCENT: 8 % (ref 3–12)
NEUTROPHILS ABSOLUTE: 2.31 K/UL (ref 1.5–8.1)
NEUTROPHILS RELATIVE PERCENT: 56 % (ref 36–65)
NRBC AUTOMATED: 0 PER 100 WBC
PDW BLD-RTO: 12.6 % (ref 11.8–14.4)
PLATELET # BLD: 205 K/UL (ref 138–453)
PMV BLD AUTO: 9.7 FL (ref 8.1–13.5)
POTASSIUM SERPL-SCNC: 4.7 MMOL/L (ref 3.7–5.3)
RBC # BLD: 3.85 M/UL (ref 3.95–5.11)
SODIUM BLD-SCNC: 142 MMOL/L (ref 136–145)
TOTAL PROTEIN: 7.1 G/DL (ref 6.6–8.7)
WBC # BLD: 4.2 K/UL (ref 3.5–11.3)

## 2025-01-08 SDOH — ECONOMIC STABILITY: FOOD INSECURITY: WITHIN THE PAST 12 MONTHS, THE FOOD YOU BOUGHT JUST DIDN'T LAST AND YOU DIDN'T HAVE MONEY TO GET MORE.: NEVER TRUE

## 2025-01-08 SDOH — ECONOMIC STABILITY: FOOD INSECURITY: WITHIN THE PAST 12 MONTHS, YOU WORRIED THAT YOUR FOOD WOULD RUN OUT BEFORE YOU GOT MONEY TO BUY MORE.: NEVER TRUE

## 2025-01-08 ASSESSMENT — ENCOUNTER SYMPTOMS
SHORTNESS OF BREATH: 0
COUGH: 0

## 2025-01-08 NOTE — ASSESSMENT & PLAN NOTE
Would like ears rechecked- still feels plugged at times. Flonase daily for about 2 weeks then every other day or as needed.

## 2025-01-08 NOTE — PROGRESS NOTES
[facility]  [facility] PRIMARY CARE  [address] [facility] [address]  Dept: [phone]    Ann Marie Banegas is a 74 y.o. female Established patient, who presents today for her medical conditions/complaints as noted below  Chief Complaint   Patient presents with    Diabetes     Patient tests blood sugary occasionally at home.       HPI:     Diabetes  Pertinent negatives for hypoglycemia include no dizziness or headaches. Pertinent negatives for diabetes include no chest pain.     Pt doing okay.  Should be checking sugars about once a day.      Reviewed prior notes:  None  Reviewed previous:  Labs    Hemoglobin A1C (%)   Date Value   01/08/2025 6.1   10/07/2024 6.6   07/05/2024 7.2             ( goal A1C is < 7)   No components found for: \"LABMICR\"  No components found for: \"LDLCHOLESTEROL\", \"LDLCALC\"    (goal LDL is <100)   AST (U/L)   Date Value   05/11/2023 19     ALT (U/L)   Date Value   05/11/2023 19     BUN (mg/dL)   Date Value   05/11/2023 35 (H)     BP Readings from Last 3 Encounters:   01/08/25 112/68   10/07/24 126/68   07/05/24 122/70          (goal 140/90)    Past Medical History:   Diagnosis Date    Colon polyp     Diabetes mellitus (HCC)     Diarrhea     Gallstones     H/O neoplasm of uncertain behavior of skin     Hearing aid worn     B/l    Hearing loss, bilateral     Hyperglycemia 11/08/2015    Hypertension     Stye     right eye    Type 2 diabetes mellitus without complication (HCC)     Wears glasses         Social History     Tobacco Use    Smoking status: Never    Smokeless tobacco: Never   Substance Use Topics    Alcohol use: Not Currently     Alcohol/week: 1.0 standard drink of alcohol     Types: 1 Glasses of wine per week     Comment: rare- 2-3 times per month at the most      Current Outpatient Medications   Medication Sig Dispense Refill    atorvastatin (LIPITOR) 20 MG tablet TAKE 1 TABLET DAILY 90 tablet 3    metFORMIN (GLUCOPHAGE-XR) 500 MG extended release

## 2025-04-10 ENCOUNTER — OFFICE VISIT (OUTPATIENT)
Dept: PRIMARY CARE CLINIC | Age: 75
End: 2025-04-10

## 2025-04-10 VITALS
SYSTOLIC BLOOD PRESSURE: 128 MMHG | HEIGHT: 61 IN | HEART RATE: 78 BPM | WEIGHT: 158.4 LBS | DIASTOLIC BLOOD PRESSURE: 76 MMHG | OXYGEN SATURATION: 99 % | BODY MASS INDEX: 29.91 KG/M2

## 2025-04-10 DIAGNOSIS — Z00.00 MEDICARE ANNUAL WELLNESS VISIT, SUBSEQUENT: Primary | ICD-10-CM

## 2025-04-10 DIAGNOSIS — E11.9 TYPE 2 DIABETES MELLITUS WITHOUT COMPLICATION, WITHOUT LONG-TERM CURRENT USE OF INSULIN: ICD-10-CM

## 2025-04-10 LAB — HBA1C MFR BLD: 5.9 %

## 2025-04-10 RX ORDER — METFORMIN HYDROCHLORIDE 500 MG/1
500 TABLET, EXTENDED RELEASE ORAL
Qty: 90 TABLET | Refills: 3
Start: 2025-04-10

## 2025-04-10 SDOH — HEALTH STABILITY: PHYSICAL HEALTH: ON AVERAGE, HOW MANY DAYS PER WEEK DO YOU ENGAGE IN MODERATE TO STRENUOUS EXERCISE (LIKE A BRISK WALK)?: 0 DAYS

## 2025-04-10 SDOH — HEALTH STABILITY: PHYSICAL HEALTH: ON AVERAGE, HOW MANY MINUTES DO YOU ENGAGE IN EXERCISE AT THIS LEVEL?: 0 MIN

## 2025-04-10 ASSESSMENT — PATIENT HEALTH QUESTIONNAIRE - PHQ9
SUM OF ALL RESPONSES TO PHQ QUESTIONS 1-9: 0
1. LITTLE INTEREST OR PLEASURE IN DOING THINGS: NOT AT ALL
SUM OF ALL RESPONSES TO PHQ QUESTIONS 1-9: 0
2. FEELING DOWN, DEPRESSED OR HOPELESS: NOT AT ALL
SUM OF ALL RESPONSES TO PHQ QUESTIONS 1-9: 0
SUM OF ALL RESPONSES TO PHQ QUESTIONS 1-9: 0

## 2025-04-10 ASSESSMENT — LIFESTYLE VARIABLES
HOW OFTEN DO YOU HAVE SIX OR MORE DRINKS ON ONE OCCASION: 1
HOW OFTEN DO YOU HAVE A DRINK CONTAINING ALCOHOL: 2-4 TIMES A MONTH
HOW OFTEN DO YOU HAVE A DRINK CONTAINING ALCOHOL: 3
HOW MANY STANDARD DRINKS CONTAINING ALCOHOL DO YOU HAVE ON A TYPICAL DAY: 1 OR 2
HOW MANY STANDARD DRINKS CONTAINING ALCOHOL DO YOU HAVE ON A TYPICAL DAY: 1

## 2025-04-10 NOTE — PATIENT INSTRUCTIONS
Learning About Being Active as an Older Adult  Why is being active important as you get older?     Being active is one of the best things you can do for your health. And it's never too late to start. Being active--or getting active, if you aren't already--has definite benefits. It can:  Give you more energy,  Keep your mind sharp.  Improve balance to reduce your risk of falls.  Help you manage chronic illness with fewer medicines.  No matter how old you are, how fit you are, or what health problems you have, there is a form of activity that will work for you. And the more physical activity you can do, the better your overall health will be.  What kinds of activity can help you stay healthy?  Being more active will make your daily activities easier. Physical activity includes planned exercise and things you do in daily life. There are four types of activity:  Aerobic.  Doing aerobic activity makes your heart and lungs strong.  Includes walking, dancing, and gardening.  Aim for at least 2½ hours spread throughout the week.  It improves your energy and can help you sleep better.  Muscle-strengthening.  This type of activity can help maintain muscle and strengthen bones.  Includes climbing stairs, using resistance bands, and lifting or carrying heavy loads.  Aim for at least twice a week.  It can help protect the knees and other joints.  Stretching.  Stretching gives you better range of motion in joints and muscles.  Includes upper arm stretches, calf stretches, and gentle yoga.  Aim for at least twice a week, preferably after your muscles are warmed up from other activities.  It can help you function better in daily life.  Balancing.  This helps you stay coordinated and have good posture.  Includes heel-to-toe walking, latisha chi, and certain types of yoga.  Aim for at least 3 days a week.  It can reduce your risk of falling.  Even if you have a hard time meeting the recommendations, it's better to be more active

## 2025-04-10 NOTE — PROGRESS NOTES
vitamin D (VITAMIN D3) 25 MCG (1000 UT) TABS tablet Take 1 tablet by mouth daily Yes Raquel Chen MD   Probiotic Product (PROBIOTIC DAILY PO) Take by mouth daily  Yes Carmen Haynes MD   aspirin 81 MG EC tablet Take 1 tablet by mouth daily Yes Carmen Haynes MD   Flaxseed, Linseed, 1000 MG CAPS Take 1 tablet by mouth. Yes Carmen Haynes MD   Multiple Vitamins-Minerals (THERAPEUTIC MULTIVITAMIN-MINERALS) tablet Take 1 tablet by mouth daily Yes Carmen Haynes MD       CareTeam (Including outside providers/suppliers regularly involved in providing care):   Patient Care Team:  Raquel Chen MD as PCP - General  Raquel Chen MD as PCP - Empaneled Provider     Recommendations for Preventive Services Due: see orders and patient instructions/AVS.  Recommended screening schedule for the next 5-10 years is provided to the patient in written form: see Patient Instructions/AVS.     Reviewed and updated this visit:  Tobacco  Allergies  Meds  Problems  Med Hx  Surg Hx  Fam Hx  Sexual   Hx                  The patient (or guardian, if applicable) and other individuals in attendance with the patient were advised that Artificial Intelligence will be utilized during this visit to record and process the conversation to generate a clinical note. The patient (or guardian, if applicable) and other individuals in attendance at the appointment consented to the use of AI, including the recording.

## 2025-06-12 DIAGNOSIS — E11.9 TYPE 2 DIABETES MELLITUS WITHOUT COMPLICATION, WITHOUT LONG-TERM CURRENT USE OF INSULIN (HCC): ICD-10-CM

## 2025-06-12 RX ORDER — PIOGLITAZONE 30 MG/1
30 TABLET ORAL DAILY
Qty: 90 TABLET | Refills: 3 | Status: SHIPPED | OUTPATIENT
Start: 2025-06-12

## 2025-06-12 NOTE — PROGRESS NOTES
cancer and precancers.  May need to send to breast surgeon for an opinion  Moisturize well and recheck in 1 month  Return in about 1 month (around 7/13/2025).for recheck on left breast lesion    Inés Hickman PA-C

## 2025-06-13 ENCOUNTER — INITIAL CONSULT (OUTPATIENT)
Dept: PRIMARY CARE CLINIC | Age: 75
End: 2025-06-13

## 2025-06-13 VITALS
DIASTOLIC BLOOD PRESSURE: 64 MMHG | HEART RATE: 76 BPM | OXYGEN SATURATION: 99 % | BODY MASS INDEX: 29.83 KG/M2 | WEIGHT: 158 LBS | HEIGHT: 61 IN | SYSTOLIC BLOOD PRESSURE: 112 MMHG

## 2025-06-13 DIAGNOSIS — L82.1 SEBORRHEIC KERATOSES: ICD-10-CM

## 2025-06-13 DIAGNOSIS — L81.4 LENTIGINES: ICD-10-CM

## 2025-06-13 DIAGNOSIS — D18.01 CHERRY ANGIOMA: Primary | ICD-10-CM

## 2025-06-13 DIAGNOSIS — L57.0 ACTINIC KERATOSIS: ICD-10-CM

## 2025-06-13 DIAGNOSIS — Z80.8 FAMILY HISTORY OF MELANOMA: ICD-10-CM

## 2025-06-13 ASSESSMENT — PATIENT HEALTH QUESTIONNAIRE - PHQ9
2. FEELING DOWN, DEPRESSED OR HOPELESS: NOT AT ALL
1. LITTLE INTEREST OR PLEASURE IN DOING THINGS: NOT AT ALL
SUM OF ALL RESPONSES TO PHQ QUESTIONS 1-9: 0

## 2025-06-13 NOTE — PROGRESS NOTES
would include:  Redness, swelling, fever.  If signs appear, please return to office.  Return in about 1 month (around 7/13/2025).    Electronically signed by Inés Hickman PA-C on 6/13/2025 at 10:16 AM

## 2025-07-18 ENCOUNTER — OFFICE VISIT (OUTPATIENT)
Dept: PRIMARY CARE CLINIC | Age: 75
End: 2025-07-18

## 2025-07-18 VITALS
HEIGHT: 61 IN | DIASTOLIC BLOOD PRESSURE: 66 MMHG | WEIGHT: 158.6 LBS | OXYGEN SATURATION: 98 % | BODY MASS INDEX: 29.94 KG/M2 | HEART RATE: 70 BPM | SYSTOLIC BLOOD PRESSURE: 110 MMHG

## 2025-07-18 DIAGNOSIS — R20.2 NUMBNESS AND TINGLING OF FOOT: ICD-10-CM

## 2025-07-18 DIAGNOSIS — I10 PRIMARY HYPERTENSION: ICD-10-CM

## 2025-07-18 DIAGNOSIS — R20.0 NUMBNESS AND TINGLING OF FOOT: ICD-10-CM

## 2025-07-18 DIAGNOSIS — R25.2 CRAMP IN LIMB: ICD-10-CM

## 2025-07-18 DIAGNOSIS — M85.89 OSTEOPENIA OF MULTIPLE SITES: ICD-10-CM

## 2025-07-18 DIAGNOSIS — E11.9 TYPE 2 DIABETES MELLITUS WITHOUT COMPLICATION, WITHOUT LONG-TERM CURRENT USE OF INSULIN (HCC): ICD-10-CM

## 2025-07-18 DIAGNOSIS — E11.9 TYPE 2 DIABETES MELLITUS WITHOUT COMPLICATION, WITHOUT LONG-TERM CURRENT USE OF INSULIN (HCC): Primary | ICD-10-CM

## 2025-07-18 LAB
ALBUMIN/GLOBULIN RATIO: 1.8 (ref 1–2.5)
ALBUMIN: 4.4 G/DL (ref 3.5–5.2)
ALP BLD-CCNC: 79 U/L (ref 35–104)
ALT SERPL-CCNC: 21 U/L (ref 10–35)
ANION GAP SERPL CALCULATED.3IONS-SCNC: 13 MMOL/L (ref 9–16)
AST SERPL-CCNC: 21 U/L (ref 10–35)
BASOPHILS ABSOLUTE: 0.03 K/UL (ref 0–0.2)
BASOPHILS RELATIVE PERCENT: 1 % (ref 0–2)
BILIRUB SERPL-MCNC: 0.4 MG/DL (ref 0–1.2)
BUN BLDV-MCNC: 29 MG/DL (ref 8–23)
CALCIUM SERPL-MCNC: 9.9 MG/DL (ref 8.6–10.4)
CHLORIDE BLD-SCNC: 107 MMOL/L (ref 98–107)
CO2: 26 MMOL/L (ref 20–31)
CREAT SERPL-MCNC: 0.9 MG/DL (ref 0.6–0.9)
EOSINOPHILS ABSOLUTE: 0.11 K/UL (ref 0–0.44)
EOSINOPHILS RELATIVE PERCENT: 3 % (ref 1–4)
FOLATE: 27.9 NG/ML (ref 4.8–24.2)
GFR, ESTIMATED: 66 ML/MIN/1.73M2
GLUCOSE BLD-MCNC: 128 MG/DL (ref 74–99)
HBA1C MFR BLD: 5.9 %
HCT VFR BLD CALC: 36.7 % (ref 36.3–47.1)
HEMOGLOBIN: 11.7 G/DL (ref 11.9–15.1)
IMMATURE GRANULOCYTES %: 0 %
IMMATURE GRANULOCYTES ABSOLUTE: <0.03 K/UL (ref 0–0.3)
LYMPHOCYTES ABSOLUTE: 1.07 K/UL (ref 1.1–3.7)
LYMPHOCYTES RELATIVE PERCENT: 30 % (ref 24–43)
MAGNESIUM: 1.7 MG/DL (ref 1.6–2.4)
MCH RBC QN AUTO: 31 PG (ref 25.2–33.5)
MCHC RBC AUTO-ENTMCNC: 31.9 G/DL (ref 28.4–34.8)
MCV RBC AUTO: 97.1 FL (ref 82.6–102.9)
MONOCYTES ABSOLUTE: 0.3 K/UL (ref 0.1–1.2)
MONOCYTES RELATIVE PERCENT: 9 % (ref 3–12)
NEUTROPHILS ABSOLUTE: 2 K/UL (ref 1.5–8.1)
NEUTROPHILS RELATIVE PERCENT: 57 % (ref 36–65)
NRBC AUTOMATED: 0 PER 100 WBC
PDW BLD-RTO: 12.9 % (ref 11.8–14.4)
PLATELET # BLD: 188 K/UL (ref 138–453)
PMV BLD AUTO: 9.6 FL (ref 8.1–13.5)
POTASSIUM SERPL-SCNC: 4.6 MMOL/L (ref 3.7–5.3)
RBC # BLD: 3.78 M/UL (ref 3.95–5.11)
SODIUM BLD-SCNC: 146 MMOL/L (ref 136–145)
TOTAL PROTEIN: 6.8 G/DL (ref 6.6–8.7)
VITAMIN B-12: 869 PG/ML (ref 232–1245)
VITAMIN D 25-HYDROXY: 29 NG/ML (ref 30–100)
WBC # BLD: 3.5 K/UL (ref 3.5–11.3)

## 2025-07-18 RX ORDER — METFORMIN HYDROCHLORIDE 500 MG/1
500 TABLET, EXTENDED RELEASE ORAL
Qty: 90 TABLET | Refills: 3 | Status: SHIPPED | OUTPATIENT
Start: 2025-07-18

## 2025-07-18 NOTE — PROGRESS NOTES
Discontinued    Diabetic retinal exam  Discontinued    Breast cancer screen  Discontinued    Colorectal Cancer Screen  Discontinued       Subjective:      Review of Systems    Objective:     /66   Pulse 70   Ht 1.549 m (5' 0.98\")   Wt 71.9 kg (158 lb 9.6 oz)   SpO2 98%   BMI 29.98 kg/m²   Physical Exam  Vitals and nursing note reviewed.   Constitutional:       General: She is not in acute distress.     Appearance: She is well-developed. She is not ill-appearing.   HENT:      Head: Normocephalic and atraumatic.      Right Ear: External ear normal.      Left Ear: External ear normal.   Eyes:      General: No scleral icterus.        Right eye: No discharge.         Left eye: No discharge.      Conjunctiva/sclera: Conjunctivae normal.   Neck:      Thyroid: No thyromegaly.      Trachea: No tracheal deviation.   Cardiovascular:      Rate and Rhythm: Normal rate and regular rhythm.      Heart sounds: Normal heart sounds.   Pulmonary:      Effort: Pulmonary effort is normal. No respiratory distress.      Breath sounds: Normal breath sounds. No wheezing.   Lymphadenopathy:      Cervical: No cervical adenopathy.   Skin:     General: Skin is warm.      Findings: No rash.   Neurological:      Mental Status: She is alert and oriented to person, place, and time.   Psychiatric:         Mood and Affect: Mood normal.         Behavior: Behavior normal.         Thought Content: Thought content normal.           Assessment and Plan:     Assessment & Plan  1. Leg cramps.  - Reports experiencing leg cramps, particularly in the sciatica-affected leg, which wake her up early in the morning.  - No associated numbness or tingling, except for some changes in her toes since the onset of sciatica.  - Tonic water recommended as a potential remedy for leg cramps, with instructions to consume 1 to 2 ounces nightly before bed.  - Lab slip will be provided for yearly blood work, including tests for magnesium and B vitamin levels, to

## 2025-07-24 NOTE — PROGRESS NOTES
[facility]  [facility]  [address] [address] [address]  Dept: [phone]    Ann Marie Banegas is a 75 y.o. female who presents today for her medical conditions/complaints as noted below.      Chief Complaint   Patient presents with    Follow-up     Patient is here for a recheck from her skin check.       HPI:     HPI    75-year-old female presents for a follow up from her previous skin check done on 6/13/2025.    At the skin check, histofreeze was applied with applicator to the AK on her left cheek. She states it was really red after treatment, but has been getting lighter in color. It is more smooth and does not bother her. She continues to apply moisturizer and use sun protection. Also reports, the lesion on her left breast has been less bothersome. She has been using Hydrocortisone cream which provides her great relief as the lesion is less rough to touch and has not been itchy. Denies noticing any other skin changes. Overall, she is doing well and reports no further questions or concerns.     No components found for: \"LDLCHOLESTEROL\", \"LDLCALC\"    (goal LDL is <100)   AST (U/L)   Date Value   07/18/2025 21     ALT (U/L)   Date Value   07/18/2025 21     BUN (mg/dL)   Date Value   07/18/2025 29 (H)     BP Readings from Last 3 Encounters:   07/25/25 110/68   07/18/25 110/66   06/13/25 112/64          (goal 120/80)    Past Medical History:   Diagnosis Date    Colon polyp     Diabetes mellitus (HCC)     Diarrhea     Gallstones     H/O neoplasm of uncertain behavior of skin     Hearing aid worn     B/l    Hearing loss, bilateral     Hyperglycemia 11/08/2015    Hypertension     Stye     right eye    Type 2 diabetes mellitus without complication (HCC)     Wears glasses       Past Surgical History:   Procedure Laterality Date    BREAST BIOPSY Right     CARDIAC CATHETERIZATION  05/07/2014    CHOLECYSTECTOMY, LAPAROSCOPIC N/A 2/2/2021    CHOLECYSTECTOMY LAPAROSCOPIC ROBOTIC

## 2025-07-25 ENCOUNTER — PROCEDURE VISIT (OUTPATIENT)
Dept: PRIMARY CARE CLINIC | Age: 75
End: 2025-07-25

## 2025-07-25 VITALS
OXYGEN SATURATION: 97 % | SYSTOLIC BLOOD PRESSURE: 110 MMHG | DIASTOLIC BLOOD PRESSURE: 68 MMHG | HEART RATE: 73 BPM | BODY MASS INDEX: 29.87 KG/M2 | HEIGHT: 61 IN | WEIGHT: 158.2 LBS

## 2025-07-25 DIAGNOSIS — L98.8 SKIN LESION OF BREAST: ICD-10-CM

## 2025-07-25 DIAGNOSIS — L57.0 ACTINIC KERATOSES: Primary | ICD-10-CM

## 2025-07-25 ASSESSMENT — ENCOUNTER SYMPTOMS
COUGH: 0
SHORTNESS OF BREATH: 0
EYE DISCHARGE: 0
EYE REDNESS: 0
COLOR CHANGE: 0

## 2025-07-25 ASSESSMENT — PATIENT HEALTH QUESTIONNAIRE - PHQ9
SUM OF ALL RESPONSES TO PHQ QUESTIONS 1-9: 0
SUM OF ALL RESPONSES TO PHQ QUESTIONS 1-9: 0
1. LITTLE INTEREST OR PLEASURE IN DOING THINGS: NOT AT ALL
SUM OF ALL RESPONSES TO PHQ QUESTIONS 1-9: 0
2. FEELING DOWN, DEPRESSED OR HOPELESS: NOT AT ALL
SUM OF ALL RESPONSES TO PHQ QUESTIONS 1-9: 0

## (undated) DEVICE — COVER,TABLE,60X90,STERILE: Brand: MEDLINE

## (undated) DEVICE — GOWN,AURORA,NONREINFORCED,LARGE: Brand: MEDLINE

## (undated) DEVICE — SOLUTION IV IRRIG WATER 1000ML POUR BRL 2F7114

## (undated) DEVICE — GLOVE ORTHO 7 1/2   MSG9475

## (undated) DEVICE — ARM DRAPE

## (undated) DEVICE — BLADELESS OBTURATOR: Brand: WECK VISTA

## (undated) DEVICE — MERCY HEALTH ST CHARLES: Brand: MEDLINE INDUSTRIES, INC.

## (undated) DEVICE — SUTURE PDS II SZ 0 L27IN ABSRB VLT UR-6 L26MM 1/2 CIR D7185

## (undated) DEVICE — Z DUP USE 2257490 ADHESIVE SKIN CLSRE 036ML TPCL 2CTL CNCRLTE HIGH VSCSTY DRMB

## (undated) DEVICE — DEFENDO AIR WATER SUCTION AND BIOPSY VALVE KIT FOR  OLYMPUS: Brand: DEFENDO AIR/WATER/SUCTION AND BIOPSY VALVE

## (undated) DEVICE — BLANKET WRM W29.9XL79.1IN UP BODY FORC AIR MISTRAL-AIR

## (undated) DEVICE — CANNULA SEAL

## (undated) DEVICE — ST CHARLES GEN LAPAROSCOPY PK: Brand: MEDLINE INDUSTRIES, INC.

## (undated) DEVICE — SYRINGE, LUER SLIP, 20ML: Brand: MEDLINE

## (undated) DEVICE — SUTURE MCRYL + SZ 4-0 L27IN ABSRB UD L19MM PS-2 3/8 CIR MCP426H

## (undated) DEVICE — COVER,MAYO STAND,XL,STERILE: Brand: MEDLINE

## (undated) DEVICE — Z INACTIVE USE 2641839 CLIP INT M L POLYMER LOK LIG HEM O LOK

## (undated) DEVICE — ENDO KIT W/SYRINGE: Brand: MEDLINE INDUSTRIES, INC.

## (undated) DEVICE — Z INACTIVE USE 2653177 SPONGE GZ W2XL2IN NONWOVEN 4 PLY FASTER WICKING ABIL AVANT

## (undated) DEVICE — FORCEPS BX L240CM WRK CHN 2.8MM STD CAP W/ NDL MIC MESH

## (undated) DEVICE — TROCARS: Brand: KII® BALLOON BLUNT TIP SYSTEM

## (undated) DEVICE — GLOVE ORANGE PI 7 1/2   MSG9075

## (undated) DEVICE — TROCAR: Brand: KII FIOS FIRST ENTRY